# Patient Record
Sex: MALE | Race: WHITE | HISPANIC OR LATINO | URBAN - METROPOLITAN AREA
[De-identification: names, ages, dates, MRNs, and addresses within clinical notes are randomized per-mention and may not be internally consistent; named-entity substitution may affect disease eponyms.]

---

## 2017-05-07 ENCOUNTER — EMERGENCY (EMERGENCY)
Facility: HOSPITAL | Age: 82
LOS: 1 days | Discharge: HOME | End: 2017-05-07
Payer: MEDICARE

## 2017-05-07 VITALS
RESPIRATION RATE: 18 BRPM | DIASTOLIC BLOOD PRESSURE: 82 MMHG | TEMPERATURE: 98 F | OXYGEN SATURATION: 97 % | SYSTOLIC BLOOD PRESSURE: 138 MMHG | HEART RATE: 74 BPM

## 2017-05-07 VITALS
OXYGEN SATURATION: 98 % | TEMPERATURE: 98 F | HEIGHT: 72 IN | SYSTOLIC BLOOD PRESSURE: 141 MMHG | DIASTOLIC BLOOD PRESSURE: 79 MMHG | RESPIRATION RATE: 18 BRPM | HEART RATE: 84 BPM | WEIGHT: 214.07 LBS

## 2017-05-07 DIAGNOSIS — N40.0 BENIGN PROSTATIC HYPERPLASIA WITHOUT LOWER URINARY TRACT SYMPTOMS: Chronic | ICD-10-CM

## 2017-05-07 DIAGNOSIS — Z98.49 CATARACT EXTRACTION STATUS, UNSPECIFIED EYE: Chronic | ICD-10-CM

## 2017-05-07 DIAGNOSIS — Z98.89 OTHER SPECIFIED POSTPROCEDURAL STATES: Chronic | ICD-10-CM

## 2017-05-07 LAB
ALBUMIN SERPL ELPH-MCNC: 3.5 G/DL — SIGNIFICANT CHANGE UP (ref 3.3–5)
ALP SERPL-CCNC: 86 U/L — SIGNIFICANT CHANGE UP (ref 30–120)
ALT FLD-CCNC: 28 U/L DA — SIGNIFICANT CHANGE UP (ref 10–60)
ANION GAP SERPL CALC-SCNC: 8 MMOL/L — SIGNIFICANT CHANGE UP (ref 5–17)
APPEARANCE UR: CLEAR — SIGNIFICANT CHANGE UP
APTT BLD: 30.1 SEC — SIGNIFICANT CHANGE UP (ref 27.5–37.4)
AST SERPL-CCNC: 18 U/L — SIGNIFICANT CHANGE UP (ref 10–40)
BACTERIA # UR AUTO: ABNORMAL
BILIRUB SERPL-MCNC: 0.8 MG/DL — SIGNIFICANT CHANGE UP (ref 0.2–1.2)
BILIRUB UR-MCNC: NEGATIVE — SIGNIFICANT CHANGE UP
BUN SERPL-MCNC: 15 MG/DL — SIGNIFICANT CHANGE UP (ref 7–23)
CALCIUM SERPL-MCNC: 9.9 MG/DL — SIGNIFICANT CHANGE UP (ref 8.4–10.5)
CHLORIDE SERPL-SCNC: 103 MMOL/L — SIGNIFICANT CHANGE UP (ref 96–108)
CO2 SERPL-SCNC: 29 MMOL/L — SIGNIFICANT CHANGE UP (ref 22–31)
COD CRY URNS QL: ABNORMAL
COLOR SPEC: YELLOW — SIGNIFICANT CHANGE UP
CREAT SERPL-MCNC: 0.89 MG/DL — SIGNIFICANT CHANGE UP (ref 0.5–1.3)
DIFF PNL FLD: ABNORMAL
GLUCOSE SERPL-MCNC: 144 MG/DL — HIGH (ref 70–99)
GLUCOSE UR QL: 250 MG/DL
HCT VFR BLD CALC: 44.4 % — SIGNIFICANT CHANGE UP (ref 39–50)
HGB BLD-MCNC: 14.8 G/DL — SIGNIFICANT CHANGE UP (ref 13–17)
INR BLD: 1.03 RATIO — SIGNIFICANT CHANGE UP (ref 0.88–1.16)
KETONES UR-MCNC: NEGATIVE — SIGNIFICANT CHANGE UP
LEUKOCYTE ESTERASE UR-ACNC: ABNORMAL
MCHC RBC-ENTMCNC: 30.4 PG — SIGNIFICANT CHANGE UP (ref 27–34)
MCHC RBC-ENTMCNC: 33.5 GM/DL — SIGNIFICANT CHANGE UP (ref 32–36)
MCV RBC AUTO: 91 FL — SIGNIFICANT CHANGE UP (ref 80–100)
NITRITE UR-MCNC: NEGATIVE — SIGNIFICANT CHANGE UP
PH UR: 6 — SIGNIFICANT CHANGE UP (ref 5–8)
PLATELET # BLD AUTO: 173 K/UL — SIGNIFICANT CHANGE UP (ref 150–400)
POTASSIUM SERPL-MCNC: 3.3 MMOL/L — LOW (ref 3.5–5.3)
POTASSIUM SERPL-SCNC: 3.3 MMOL/L — LOW (ref 3.5–5.3)
PROT SERPL-MCNC: 7.2 G/DL — SIGNIFICANT CHANGE UP (ref 6–8.3)
PROT UR-MCNC: 30 MG/DL
PROTHROM AB SERPL-ACNC: 11.2 SEC — SIGNIFICANT CHANGE UP (ref 9.8–12.7)
RBC # BLD: 4.88 M/UL — SIGNIFICANT CHANGE UP (ref 4.2–5.8)
RBC # FLD: 12.6 % — SIGNIFICANT CHANGE UP (ref 10.3–14.5)
RBC CASTS # UR COMP ASSIST: ABNORMAL /HPF (ref 0–4)
SODIUM SERPL-SCNC: 140 MMOL/L — SIGNIFICANT CHANGE UP (ref 135–145)
SP GR SPEC: 1.01 — SIGNIFICANT CHANGE UP (ref 1.01–1.02)
UROBILINOGEN FLD QL: NEGATIVE MG/DL — SIGNIFICANT CHANGE UP
WBC # BLD: 8.8 K/UL — SIGNIFICANT CHANGE UP (ref 3.8–10.5)
WBC # FLD AUTO: 8.8 K/UL — SIGNIFICANT CHANGE UP (ref 3.8–10.5)
WBC UR QL: SIGNIFICANT CHANGE UP

## 2017-05-07 PROCEDURE — 99284 EMERGENCY DEPT VISIT MOD MDM: CPT | Mod: 25

## 2017-05-07 PROCEDURE — 85610 PROTHROMBIN TIME: CPT

## 2017-05-07 PROCEDURE — 85027 COMPLETE CBC AUTOMATED: CPT

## 2017-05-07 PROCEDURE — 80053 COMPREHEN METABOLIC PANEL: CPT

## 2017-05-07 PROCEDURE — 71046 X-RAY EXAM CHEST 2 VIEWS: CPT

## 2017-05-07 PROCEDURE — 99285 EMERGENCY DEPT VISIT HI MDM: CPT

## 2017-05-07 PROCEDURE — 74177 CT ABD & PELVIS W/CONTRAST: CPT

## 2017-05-07 PROCEDURE — 71020: CPT | Mod: 26

## 2017-05-07 PROCEDURE — 74177 CT ABD & PELVIS W/CONTRAST: CPT | Mod: 26

## 2017-05-07 PROCEDURE — 85730 THROMBOPLASTIN TIME PARTIAL: CPT

## 2017-05-07 PROCEDURE — 74020: CPT | Mod: 26

## 2017-05-07 PROCEDURE — 81001 URINALYSIS AUTO W/SCOPE: CPT

## 2017-05-07 PROCEDURE — 74020: CPT

## 2017-05-07 RX ORDER — TAMSULOSIN HYDROCHLORIDE 0.4 MG/1
1 CAPSULE ORAL
Qty: 7 | Refills: 0
Start: 2017-05-07 | End: 2017-05-14

## 2017-05-07 RX ORDER — ONDANSETRON 8 MG/1
1 TABLET, FILM COATED ORAL
Qty: 12 | Refills: 0
Start: 2017-05-07

## 2017-05-07 RX ORDER — SODIUM CHLORIDE 9 MG/ML
500 INJECTION INTRAMUSCULAR; INTRAVENOUS; SUBCUTANEOUS ONCE
Qty: 0 | Refills: 0 | Status: COMPLETED | OUTPATIENT
Start: 2017-05-07 | End: 2017-05-07

## 2017-05-07 RX ORDER — IOHEXOL 300 MG/ML
30 INJECTION, SOLUTION INTRAVENOUS ONCE
Qty: 0 | Refills: 0 | Status: COMPLETED | OUTPATIENT
Start: 2017-05-07 | End: 2017-05-07

## 2017-05-07 RX ORDER — SODIUM CHLORIDE 9 MG/ML
10 INJECTION INTRAMUSCULAR; INTRAVENOUS; SUBCUTANEOUS ONCE
Qty: 0 | Refills: 0 | Status: COMPLETED | OUTPATIENT
Start: 2017-05-07 | End: 2017-05-07

## 2017-05-07 RX ADMIN — SODIUM CHLORIDE 500 MILLILITER(S): 9 INJECTION INTRAMUSCULAR; INTRAVENOUS; SUBCUTANEOUS at 16:24

## 2017-05-07 RX ADMIN — SODIUM CHLORIDE 10 MILLILITER(S): 9 INJECTION INTRAMUSCULAR; INTRAVENOUS; SUBCUTANEOUS at 15:12

## 2017-05-07 RX ADMIN — IOHEXOL 30 MILLILITER(S): 300 INJECTION, SOLUTION INTRAVENOUS at 15:13

## 2017-05-07 NOTE — ED PROVIDER NOTE - MEDICAL DECISION MAKING DETAILS
Pt feeling better at this time and was told about his 4mmm right UVJ renal stone. Pt. will follow up with his urologist in a.m.

## 2017-05-07 NOTE — ED ADULT NURSE REASSESSMENT NOTE - NS ED NURSE REASSESS COMMENT FT1
Patient drank all of contrast. Denies pain.
Returning from CT, awaiting results. VSS
Awaing ct @ 8477
Patient drinking contrast. Tolerating it well.

## 2017-05-07 NOTE — ED PROVIDER NOTE - GASTROINTESTINAL, MLM
Abdomen soft, non-tender, no guarding. Well healed scar from right inguinal hernia repair. Small scar from left varicocele surgery.

## 2017-05-07 NOTE — ED PROVIDER NOTE - OBJECTIVE STATEMENT
85 y/o M pt with PMHx of HTN, varicocele, and inguinal hernia and surgical hx of tonsillectomy presents to the ED c/o RLQ abdominal pain, onset today. Reports after bowel movement today, pain worsening. Also reports a kidney mass. Denies chest pain, back pain, nausea, vomiting, or shortness of breath. No other complaints at this time.  Medications: Losartan - 125 mg once/day  PMD: Dr. Way

## 2017-07-20 ENCOUNTER — EMERGENCY (EMERGENCY)
Facility: HOSPITAL | Age: 82
LOS: 1 days | Discharge: HOME | End: 2017-07-20
Attending: EMERGENCY MEDICINE | Admitting: EMERGENCY MEDICINE
Payer: MEDICARE

## 2017-07-20 VITALS
HEART RATE: 90 BPM | RESPIRATION RATE: 16 BRPM | OXYGEN SATURATION: 99 % | DIASTOLIC BLOOD PRESSURE: 91 MMHG | SYSTOLIC BLOOD PRESSURE: 165 MMHG | TEMPERATURE: 98 F

## 2017-07-20 VITALS
DIASTOLIC BLOOD PRESSURE: 86 MMHG | HEART RATE: 67 BPM | SYSTOLIC BLOOD PRESSURE: 151 MMHG | TEMPERATURE: 98 F | RESPIRATION RATE: 16 BRPM | OXYGEN SATURATION: 98 %

## 2017-07-20 DIAGNOSIS — Z98.89 OTHER SPECIFIED POSTPROCEDURAL STATES: Chronic | ICD-10-CM

## 2017-07-20 DIAGNOSIS — N40.0 BENIGN PROSTATIC HYPERPLASIA WITHOUT LOWER URINARY TRACT SYMPTOMS: Chronic | ICD-10-CM

## 2017-07-20 DIAGNOSIS — Z98.49 CATARACT EXTRACTION STATUS, UNSPECIFIED EYE: Chronic | ICD-10-CM

## 2017-07-20 LAB
ALBUMIN SERPL ELPH-MCNC: 3.8 G/DL — SIGNIFICANT CHANGE UP (ref 3.3–5)
ALP SERPL-CCNC: 74 U/L — SIGNIFICANT CHANGE UP (ref 40–120)
ALT FLD-CCNC: 18 U/L RC — SIGNIFICANT CHANGE UP (ref 10–45)
ANION GAP SERPL CALC-SCNC: 14 MMOL/L — SIGNIFICANT CHANGE UP (ref 5–17)
APPEARANCE UR: ABNORMAL
AST SERPL-CCNC: 16 U/L — SIGNIFICANT CHANGE UP (ref 10–40)
BASOPHILS # BLD AUTO: 0 K/UL — SIGNIFICANT CHANGE UP (ref 0–0.2)
BASOPHILS NFR BLD AUTO: 0.4 % — SIGNIFICANT CHANGE UP (ref 0–2)
BILIRUB SERPL-MCNC: 0.6 MG/DL — SIGNIFICANT CHANGE UP (ref 0.2–1.2)
BILIRUB UR-MCNC: NEGATIVE — SIGNIFICANT CHANGE UP
BUN SERPL-MCNC: 21 MG/DL — SIGNIFICANT CHANGE UP (ref 7–23)
CALCIUM SERPL-MCNC: 10 MG/DL — SIGNIFICANT CHANGE UP (ref 8.4–10.5)
CHLORIDE SERPL-SCNC: 98 MMOL/L — SIGNIFICANT CHANGE UP (ref 96–108)
CO2 SERPL-SCNC: 26 MMOL/L — SIGNIFICANT CHANGE UP (ref 22–31)
COLOR SPEC: YELLOW — SIGNIFICANT CHANGE UP
COMMENT - URINE: SIGNIFICANT CHANGE UP
CREAT SERPL-MCNC: 1.17 MG/DL — SIGNIFICANT CHANGE UP (ref 0.5–1.3)
DIFF PNL FLD: ABNORMAL
EOSINOPHIL # BLD AUTO: 0.1 K/UL — SIGNIFICANT CHANGE UP (ref 0–0.5)
EOSINOPHIL NFR BLD AUTO: 1.1 % — SIGNIFICANT CHANGE UP (ref 0–6)
EPI CELLS # UR: SIGNIFICANT CHANGE UP /HPF
GLUCOSE SERPL-MCNC: 173 MG/DL — HIGH (ref 70–99)
GLUCOSE UR QL: 50
HCT VFR BLD CALC: 43.1 % — SIGNIFICANT CHANGE UP (ref 39–50)
HGB BLD-MCNC: 14.8 G/DL — SIGNIFICANT CHANGE UP (ref 13–17)
KETONES UR-MCNC: NEGATIVE — SIGNIFICANT CHANGE UP
LEUKOCYTE ESTERASE UR-ACNC: NEGATIVE — SIGNIFICANT CHANGE UP
LIDOCAIN IGE QN: 39 U/L — SIGNIFICANT CHANGE UP (ref 7–60)
LYMPHOCYTES # BLD AUTO: 1.5 K/UL — SIGNIFICANT CHANGE UP (ref 1–3.3)
LYMPHOCYTES # BLD AUTO: 12.4 % — LOW (ref 13–44)
MCHC RBC-ENTMCNC: 32.2 PG — SIGNIFICANT CHANGE UP (ref 27–34)
MCHC RBC-ENTMCNC: 34.3 GM/DL — SIGNIFICANT CHANGE UP (ref 32–36)
MCV RBC AUTO: 93.9 FL — SIGNIFICANT CHANGE UP (ref 80–100)
MONOCYTES # BLD AUTO: 1 K/UL — HIGH (ref 0–0.9)
MONOCYTES NFR BLD AUTO: 8.5 % — SIGNIFICANT CHANGE UP (ref 2–14)
NEUTROPHILS # BLD AUTO: 9.3 K/UL — HIGH (ref 1.8–7.4)
NEUTROPHILS NFR BLD AUTO: 77.6 % — HIGH (ref 43–77)
NITRITE UR-MCNC: NEGATIVE — SIGNIFICANT CHANGE UP
PH UR: 7 — SIGNIFICANT CHANGE UP (ref 5–8)
PLATELET # BLD AUTO: 179 K/UL — SIGNIFICANT CHANGE UP (ref 150–400)
POTASSIUM SERPL-MCNC: 3.6 MMOL/L — SIGNIFICANT CHANGE UP (ref 3.5–5.3)
POTASSIUM SERPL-SCNC: 3.6 MMOL/L — SIGNIFICANT CHANGE UP (ref 3.5–5.3)
PROT SERPL-MCNC: 6.9 G/DL — SIGNIFICANT CHANGE UP (ref 6–8.3)
PROT UR-MCNC: SIGNIFICANT CHANGE UP
RBC # BLD: 4.59 M/UL — SIGNIFICANT CHANGE UP (ref 4.2–5.8)
RBC # FLD: 12.2 % — SIGNIFICANT CHANGE UP (ref 10.3–14.5)
RBC CASTS # UR COMP ASSIST: ABNORMAL /HPF (ref 0–2)
SODIUM SERPL-SCNC: 138 MMOL/L — SIGNIFICANT CHANGE UP (ref 135–145)
SP GR SPEC: 1.02 — SIGNIFICANT CHANGE UP (ref 1.01–1.02)
UROBILINOGEN FLD QL: NEGATIVE — SIGNIFICANT CHANGE UP
WBC # BLD: 12 K/UL — HIGH (ref 3.8–10.5)
WBC # FLD AUTO: 12 K/UL — HIGH (ref 3.8–10.5)
WBC UR QL: SIGNIFICANT CHANGE UP /HPF (ref 0–5)

## 2017-07-20 PROCEDURE — 96374 THER/PROPH/DIAG INJ IV PUSH: CPT

## 2017-07-20 PROCEDURE — 76775 US EXAM ABDO BACK WALL LIM: CPT | Mod: 26

## 2017-07-20 PROCEDURE — 83690 ASSAY OF LIPASE: CPT

## 2017-07-20 PROCEDURE — 93010 ELECTROCARDIOGRAM REPORT: CPT

## 2017-07-20 PROCEDURE — 74176 CT ABD & PELVIS W/O CONTRAST: CPT | Mod: 26

## 2017-07-20 PROCEDURE — 85027 COMPLETE CBC AUTOMATED: CPT

## 2017-07-20 PROCEDURE — 74176 CT ABD & PELVIS W/O CONTRAST: CPT

## 2017-07-20 PROCEDURE — 80053 COMPREHEN METABOLIC PANEL: CPT

## 2017-07-20 PROCEDURE — 81001 URINALYSIS AUTO W/SCOPE: CPT

## 2017-07-20 PROCEDURE — 87086 URINE CULTURE/COLONY COUNT: CPT

## 2017-07-20 PROCEDURE — 93005 ELECTROCARDIOGRAM TRACING: CPT

## 2017-07-20 PROCEDURE — 99285 EMERGENCY DEPT VISIT HI MDM: CPT | Mod: 25

## 2017-07-20 PROCEDURE — 99284 EMERGENCY DEPT VISIT MOD MDM: CPT | Mod: 25

## 2017-07-20 PROCEDURE — 76775 US EXAM ABDO BACK WALL LIM: CPT

## 2017-07-20 RX ORDER — TAMSULOSIN HYDROCHLORIDE 0.4 MG/1
1 CAPSULE ORAL
Qty: 30 | Refills: 0
Start: 2017-07-20 | End: 2017-08-19

## 2017-07-20 RX ORDER — SODIUM CHLORIDE 9 MG/ML
500 INJECTION INTRAMUSCULAR; INTRAVENOUS; SUBCUTANEOUS ONCE
Qty: 0 | Refills: 0 | Status: COMPLETED | OUTPATIENT
Start: 2017-07-20 | End: 2017-07-20

## 2017-07-20 RX ORDER — ONDANSETRON 8 MG/1
1 TABLET, FILM COATED ORAL
Qty: 9 | Refills: 0
Start: 2017-07-20 | End: 2017-07-23

## 2017-07-20 RX ORDER — TAMSULOSIN HYDROCHLORIDE 0.4 MG/1
0.4 CAPSULE ORAL AT BEDTIME
Qty: 0 | Refills: 0 | Status: DISCONTINUED | OUTPATIENT
Start: 2017-07-20 | End: 2017-07-24

## 2017-07-20 RX ORDER — ACETAMINOPHEN 500 MG
1000 TABLET ORAL ONCE
Qty: 0 | Refills: 0 | Status: COMPLETED | OUTPATIENT
Start: 2017-07-20 | End: 2017-07-20

## 2017-07-20 RX ADMIN — SODIUM CHLORIDE 500 MILLILITER(S): 9 INJECTION INTRAMUSCULAR; INTRAVENOUS; SUBCUTANEOUS at 18:19

## 2017-07-20 RX ADMIN — TAMSULOSIN HYDROCHLORIDE 0.4 MILLIGRAM(S): 0.4 CAPSULE ORAL at 20:16

## 2017-07-20 RX ADMIN — Medication 1000 MILLIGRAM(S): at 20:16

## 2017-07-20 RX ADMIN — Medication 400 MILLIGRAM(S): at 18:19

## 2017-07-20 NOTE — ED PROVIDER NOTE - PHYSICAL EXAMINATION
Attending Min: Gen: NAD, heent: atrauamtic, eomi, perrla, mmm, op pink, uvula midline, neck; nttp, no nuchal rigidity, chest: nttp, no crepitus, cv: rrr, no murmurs, lungs: ctab, abd: soft, nontender, nondistended, no peritoneal signs, +BS, no guarding, ext: wwp, neg homans, right flank pain, skin: no rash, neuro: awake and alert, following commands, speech clear, sensation and strength intact, no focal deficits

## 2017-07-20 NOTE — ED ADULT NURSE NOTE - OBJECTIVE STATEMENT
85yo male pt AxOx3 ambulatory to ED c/o R flank pain. Pt states he had a kidney stone last month, pain is similar. Denies N/V/D/fever/chills. Denies CP/SOB. Pt states pain started in umbilical area now radiating to R flank area. Abd soft/NT/ND/+BSx4. +CVA tenderness. Afebrile. NAD noted. #18G RAC, labs drawn and sent. MD at bedside for eval. Family at bedside.

## 2017-07-20 NOTE — ED PROVIDER NOTE - MEDICAL DECISION MAKING DETAILS
Attending Pako: 85 y/o male h/o ureteral colic, presenting with right sided flank discomfort. no dysuria or fevers. pocus shows right mild hydronephrosis. no visible AAA and normal appearing gallbladder. no RLQ ttp to suggest acute appendicitis. will check labs, UA, and ct ab/pel. pt with follow up scheduled with dr lund his urologist

## 2017-07-20 NOTE — ED PROVIDER NOTE - PROGRESS NOTE DETAILS
Attending Min: pt pain free currently. ct shows 6mm right uvj stone. has appt with urologist tomorrow. awaiting ua

## 2017-07-20 NOTE — ED PROVIDER NOTE - OBJECTIVE STATEMENT
Attending Min: 83 y/o male presenting with lower abdmoinal pain with radiation to the right lower back. pain started approximately 2 hours ago [place] [facility]. pt tried to urinate afterward but with some difficulty. had similar pain a few months ago and diagnosed with renal stone. pain constant since starting. +nausea. small amount of emesis. no previous abdominal surgeries. no testicular pain. no hematuria. no difficulty with bm. no black or bloody stools. took an aleve earlier with some improvement. pain is constant. no paresthesias. h/o BPH. no fevers or chills  PMH: HTN  PCP: Horacio Olivera Attending Min: 83 y/o male presenting with lower abdmoinal pain with radiation to the right lower back. pain started approximately 2 hours ago [place] [facility]. pt tried to urinate afterward but with some difficulty. had similar pain a few months ago and diagnosed with renal stone. pain constant since starting. +nausea. small amount of emesis. no previous abdominal surgeries. no testicular pain. no hematuria. no difficulty with bm. no black or bloody stools. took an aleve earlier with some improvement. pain is constant. no paresthesias. h/o BPH. no fevers or chills  PMH: HTN  PCP: Horacio Olivera  : Dr Orosco

## 2017-07-21 LAB
CULTURE RESULTS: NO GROWTH — SIGNIFICANT CHANGE UP
SPECIMEN SOURCE: SIGNIFICANT CHANGE UP

## 2017-08-11 ENCOUNTER — APPOINTMENT (OUTPATIENT)
Dept: CT IMAGING | Facility: CLINIC | Age: 82
End: 2017-08-11
Payer: MEDICARE

## 2017-08-11 ENCOUNTER — OUTPATIENT (OUTPATIENT)
Dept: OUTPATIENT SERVICES | Facility: HOSPITAL | Age: 82
LOS: 1 days | End: 2017-08-11
Payer: MEDICARE

## 2017-08-11 DIAGNOSIS — Z98.89 OTHER SPECIFIED POSTPROCEDURAL STATES: Chronic | ICD-10-CM

## 2017-08-11 DIAGNOSIS — Z00.8 ENCOUNTER FOR OTHER GENERAL EXAMINATION: ICD-10-CM

## 2017-08-11 DIAGNOSIS — N40.0 BENIGN PROSTATIC HYPERPLASIA WITHOUT LOWER URINARY TRACT SYMPTOMS: Chronic | ICD-10-CM

## 2017-08-11 DIAGNOSIS — Z98.49 CATARACT EXTRACTION STATUS, UNSPECIFIED EYE: Chronic | ICD-10-CM

## 2017-08-11 PROCEDURE — 70450 CT HEAD/BRAIN W/O DYE: CPT

## 2017-08-11 PROCEDURE — 70450 CT HEAD/BRAIN W/O DYE: CPT | Mod: 26

## 2018-12-10 DIAGNOSIS — N28.89 OTHER SPECIFIED DISORDERS OF KIDNEY AND URETER: ICD-10-CM

## 2018-12-10 DIAGNOSIS — H26.9 UNSPECIFIED CATARACT: ICD-10-CM

## 2018-12-10 DIAGNOSIS — Z87.442 PERSONAL HISTORY OF URINARY CALCULI: ICD-10-CM

## 2018-12-10 RX ORDER — LOSARTAN POTASSIUM AND HYDROCHLOROTHIAZIDE 25; 100 MG/1; MG/1
100-25 TABLET ORAL
Refills: 0 | Status: ACTIVE | COMMUNITY

## 2018-12-10 RX ORDER — ASPIRIN 325 MG/1
TABLET, FILM COATED ORAL
Refills: 0 | Status: ACTIVE | COMMUNITY

## 2018-12-11 ENCOUNTER — CHART COPY (OUTPATIENT)
Age: 83
End: 2018-12-11

## 2018-12-11 DIAGNOSIS — K63.5 POLYP OF COLON: ICD-10-CM

## 2019-01-07 ENCOUNTER — OUTPATIENT (OUTPATIENT)
Dept: OUTPATIENT SERVICES | Facility: HOSPITAL | Age: 84
LOS: 1 days | End: 2019-01-07
Payer: MEDICARE

## 2019-01-07 ENCOUNTER — APPOINTMENT (OUTPATIENT)
Dept: SURGERY | Facility: HOSPITAL | Age: 84
End: 2019-01-07
Payer: MEDICARE

## 2019-01-07 ENCOUNTER — RESULT REVIEW (OUTPATIENT)
Age: 84
End: 2019-01-07

## 2019-01-07 DIAGNOSIS — N40.0 BENIGN PROSTATIC HYPERPLASIA WITHOUT LOWER URINARY TRACT SYMPTOMS: Chronic | ICD-10-CM

## 2019-01-07 DIAGNOSIS — Z98.49 CATARACT EXTRACTION STATUS, UNSPECIFIED EYE: Chronic | ICD-10-CM

## 2019-01-07 DIAGNOSIS — Z98.89 OTHER SPECIFIED POSTPROCEDURAL STATES: Chronic | ICD-10-CM

## 2019-01-07 DIAGNOSIS — K63.5 POLYP OF COLON: ICD-10-CM

## 2019-01-07 PROCEDURE — 45385 COLONOSCOPY W/LESION REMOVAL: CPT | Mod: PT

## 2019-01-07 PROCEDURE — 45385 COLONOSCOPY W/LESION REMOVAL: CPT

## 2019-01-08 LAB — SURGICAL PATHOLOGY STUDY: SIGNIFICANT CHANGE UP

## 2021-11-05 ENCOUNTER — INPATIENT (INPATIENT)
Facility: HOSPITAL | Age: 86
LOS: 2 days | Discharge: HOME | DRG: 310 | End: 2021-11-08
Attending: INTERNAL MEDICINE | Admitting: INTERNAL MEDICINE
Payer: MEDICARE

## 2021-11-05 VITALS
DIASTOLIC BLOOD PRESSURE: 105 MMHG | WEIGHT: 209.44 LBS | RESPIRATION RATE: 22 BRPM | OXYGEN SATURATION: 96 % | HEIGHT: 72 IN | SYSTOLIC BLOOD PRESSURE: 164 MMHG | TEMPERATURE: 98 F | HEART RATE: 140 BPM

## 2021-11-05 DIAGNOSIS — Z98.49 CATARACT EXTRACTION STATUS, UNSPECIFIED EYE: Chronic | ICD-10-CM

## 2021-11-05 DIAGNOSIS — N40.0 BENIGN PROSTATIC HYPERPLASIA WITHOUT LOWER URINARY TRACT SYMPTOMS: Chronic | ICD-10-CM

## 2021-11-05 DIAGNOSIS — I48.91 UNSPECIFIED ATRIAL FIBRILLATION: ICD-10-CM

## 2021-11-05 DIAGNOSIS — Z98.89 OTHER SPECIFIED POSTPROCEDURAL STATES: Chronic | ICD-10-CM

## 2021-11-05 LAB
ALBUMIN SERPL ELPH-MCNC: 3.4 G/DL — SIGNIFICANT CHANGE UP (ref 3.3–5)
ALP SERPL-CCNC: 75 U/L — SIGNIFICANT CHANGE UP (ref 30–120)
ALT FLD-CCNC: 28 U/L DA — SIGNIFICANT CHANGE UP (ref 10–60)
ANION GAP SERPL CALC-SCNC: 9 MMOL/L — SIGNIFICANT CHANGE UP (ref 5–17)
APTT BLD: 31 SEC — SIGNIFICANT CHANGE UP (ref 27.5–35.5)
AST SERPL-CCNC: 15 U/L — SIGNIFICANT CHANGE UP (ref 10–40)
BASOPHILS # BLD AUTO: 0.03 K/UL — SIGNIFICANT CHANGE UP (ref 0–0.2)
BASOPHILS NFR BLD AUTO: 0.4 % — SIGNIFICANT CHANGE UP (ref 0–2)
BILIRUB SERPL-MCNC: 0.5 MG/DL — SIGNIFICANT CHANGE UP (ref 0.2–1.2)
BUN SERPL-MCNC: 19 MG/DL — SIGNIFICANT CHANGE UP (ref 7–23)
CALCIUM SERPL-MCNC: 9.7 MG/DL — SIGNIFICANT CHANGE UP (ref 8.4–10.5)
CHLORIDE SERPL-SCNC: 99 MMOL/L — SIGNIFICANT CHANGE UP (ref 96–108)
CO2 SERPL-SCNC: 27 MMOL/L — SIGNIFICANT CHANGE UP (ref 22–31)
CREAT SERPL-MCNC: 0.93 MG/DL — SIGNIFICANT CHANGE UP (ref 0.5–1.3)
EOSINOPHIL # BLD AUTO: 0.12 K/UL — SIGNIFICANT CHANGE UP (ref 0–0.5)
EOSINOPHIL NFR BLD AUTO: 1.5 % — SIGNIFICANT CHANGE UP (ref 0–6)
GLUCOSE SERPL-MCNC: 172 MG/DL — HIGH (ref 70–99)
HCT VFR BLD CALC: 43.1 % — SIGNIFICANT CHANGE UP (ref 39–50)
HGB BLD-MCNC: 14.6 G/DL — SIGNIFICANT CHANGE UP (ref 13–17)
IMM GRANULOCYTES NFR BLD AUTO: 0.3 % — SIGNIFICANT CHANGE UP (ref 0–1.5)
INR BLD: 0.97 RATIO — SIGNIFICANT CHANGE UP (ref 0.88–1.16)
LYMPHOCYTES # BLD AUTO: 2.34 K/UL — SIGNIFICANT CHANGE UP (ref 1–3.3)
LYMPHOCYTES # BLD AUTO: 30 % — SIGNIFICANT CHANGE UP (ref 13–44)
MAGNESIUM SERPL-MCNC: 1.8 MG/DL — SIGNIFICANT CHANGE UP (ref 1.6–2.6)
MCHC RBC-ENTMCNC: 31.2 PG — SIGNIFICANT CHANGE UP (ref 27–34)
MCHC RBC-ENTMCNC: 33.9 GM/DL — SIGNIFICANT CHANGE UP (ref 32–36)
MCV RBC AUTO: 92.1 FL — SIGNIFICANT CHANGE UP (ref 80–100)
MONOCYTES # BLD AUTO: 0.76 K/UL — SIGNIFICANT CHANGE UP (ref 0–0.9)
MONOCYTES NFR BLD AUTO: 9.8 % — SIGNIFICANT CHANGE UP (ref 2–14)
NEUTROPHILS # BLD AUTO: 4.52 K/UL — SIGNIFICANT CHANGE UP (ref 1.8–7.4)
NEUTROPHILS NFR BLD AUTO: 58 % — SIGNIFICANT CHANGE UP (ref 43–77)
NRBC # BLD: 0 /100 WBCS — SIGNIFICANT CHANGE UP (ref 0–0)
PLATELET # BLD AUTO: 138 K/UL — LOW (ref 150–400)
POTASSIUM SERPL-MCNC: 3.4 MMOL/L — LOW (ref 3.5–5.3)
POTASSIUM SERPL-SCNC: 3.4 MMOL/L — LOW (ref 3.5–5.3)
PROT SERPL-MCNC: 7.1 G/DL — SIGNIFICANT CHANGE UP (ref 6–8.3)
PROTHROM AB SERPL-ACNC: 11.8 SEC — SIGNIFICANT CHANGE UP (ref 10.6–13.6)
RBC # BLD: 4.68 M/UL — SIGNIFICANT CHANGE UP (ref 4.2–5.8)
RBC # FLD: 13.2 % — SIGNIFICANT CHANGE UP (ref 10.3–14.5)
SODIUM SERPL-SCNC: 135 MMOL/L — SIGNIFICANT CHANGE UP (ref 135–145)
TROPONIN I, HIGH SENSITIVITY RESULT: 11.8 NG/L — SIGNIFICANT CHANGE UP
WBC # BLD: 7.79 K/UL — SIGNIFICANT CHANGE UP (ref 3.8–10.5)
WBC # FLD AUTO: 7.79 K/UL — SIGNIFICANT CHANGE UP (ref 3.8–10.5)

## 2021-11-05 PROCEDURE — 99285 EMERGENCY DEPT VISIT HI MDM: CPT

## 2021-11-05 PROCEDURE — 99223 1ST HOSP IP/OBS HIGH 75: CPT

## 2021-11-05 PROCEDURE — 71045 X-RAY EXAM CHEST 1 VIEW: CPT | Mod: 26

## 2021-11-05 PROCEDURE — 93010 ELECTROCARDIOGRAM REPORT: CPT

## 2021-11-05 RX ORDER — DILTIAZEM HCL 120 MG
5 CAPSULE, EXT RELEASE 24 HR ORAL
Qty: 125 | Refills: 0 | Status: DISCONTINUED | OUTPATIENT
Start: 2021-11-05 | End: 2021-11-07

## 2021-11-05 RX ORDER — METFORMIN HYDROCHLORIDE 850 MG/1
1 TABLET ORAL
Qty: 0 | Refills: 0 | DISCHARGE

## 2021-11-05 RX ORDER — DILTIAZEM HCL 120 MG
10 CAPSULE, EXT RELEASE 24 HR ORAL ONCE
Refills: 0 | Status: COMPLETED | OUTPATIENT
Start: 2021-11-05 | End: 2021-11-05

## 2021-11-05 RX ORDER — MAGNESIUM SULFATE 500 MG/ML
2 VIAL (ML) INJECTION ONCE
Refills: 0 | Status: COMPLETED | OUTPATIENT
Start: 2021-11-05 | End: 2021-11-06

## 2021-11-05 RX ORDER — POTASSIUM CHLORIDE 20 MEQ
40 PACKET (EA) ORAL EVERY 4 HOURS
Refills: 0 | Status: COMPLETED | OUTPATIENT
Start: 2021-11-05 | End: 2021-11-06

## 2021-11-05 RX ADMIN — Medication 10 MILLIGRAM(S): at 22:51

## 2021-11-05 RX ADMIN — Medication 10 MILLIGRAM(S): at 23:20

## 2021-11-05 RX ADMIN — Medication 5 MG/HR: at 23:36

## 2021-11-05 NOTE — H&P ADULT - PROBLEM SELECTOR PLAN 1
currently on Diltiazem drip after 2 doses of IVP Diltiazem 10 mg each by ED team, rate controlled, titrate for HR < 100 bpm, patient with [id number] score of 4, started him on UFH infusion for full anticoagulation to maintain him on a DOAC, patient's daughter admitted that he snores very loudly but unsure if he has SHEILA, discussed with him & daughter at the bedside the need to get a sleep study as an outpatient for possible underlying SHEILA, they both understand & agree. Negative 1st high sensitivity troponin, repeat in am, get TSH level and TTE in am, cardiology consult with Dr. Kern was called.

## 2021-11-05 NOTE — ED PROVIDER NOTE - NSICDXPASTSURGICALHX_GEN_ALL_CORE_FT
PAST SURGICAL HISTORY:  Benign enlargement of prostate     S/P cataract surgery 4 years ago    S/P TURP

## 2021-11-05 NOTE — H&P ADULT - PROBLEM SELECTOR PLAN 5
patient currently full anticoagulated with UFH infusion to maintain on a DOAC, no need for further DVT prophylaxis.

## 2021-11-05 NOTE — H&P ADULT - ASSESSMENT
89 y/o M with PMH of HTN, DM type 2, Nephrolithiasis s/p Lithotripsy, Renal Mass s/p cryo ablation, and BPH s/p TURP presented with Palpitation episodes, found to have A. Fib with RVR.

## 2021-11-05 NOTE — H&P ADULT - NSHPLABSRESULTS_GEN_ALL_CORE
-                        14.6   7.79  )-----------( 138      ( 05 Nov 2021 22:54 )             43.1            11-05    135  |  99  |  19  ----------------------------<  172<H>  3.4<L>   |  27  |  0.93    Ca    9.7      05 Nov 2021 22:54  Mg     1.8     11-05    TPro  7.1  /  Alb  3.4  /  TBili  0.5  /  DBili  x   /  AST  15  /  ALT  28  /  AlkPhos  75  11-05            PT/INR - ( 05 Nov 2021 22:54 )   PT: 11.8 sec;   INR: 0.97 ratio    PTT - ( 05 Nov 2021 22:54 )  PTT:31.0 sec        CXR:    As per my review shows normal cardiac shadow size, clear lung fields B/L, no pulmonary infiltrates, pleural effusion, or pneumothorax. Pending official report.         EKG:    As per my review shows A. Fib with RVR at 140/min, with frequent aberrantly conducted beats, normal QRS voltage, duration, and axis (+75), with normal transition, nonspecific ST-T abnormality.      - -                        14.6   7.79  )-----------( 138      ( 05 Nov 2021 22:54 )             43.1            11-05    135  |  99  |  19  ----------------------------<  172<H>  3.4<L>   |  27  |  0.93      Ca    9.7      05 Nov 2021 22:54  Mg     1.8     11-05      TPro  7.1  /  Alb  3.4  /  TBili  0.5  /  DBili  x   /  AST  15  /  ALT  28  /  AlkPhos  75  11-05            PT/INR - ( 05 Nov 2021 22:54 )   PT: 11.8 sec;   INR: 0.97 ratio    PTT - ( 05 Nov 2021 22:54 )  PTT:31.0 sec        CXR:    As per my review shows normal cardiac shadow size, clear lung fields B/L, no pulmonary infiltrates, pleural effusion, or pneumothorax. Pending official report.         EKG:    As per my review shows A. Fib with RVR at 140/min, with frequent aberrantly conducted beats, normal QRS voltage, duration, and axis (+75), with normal transition, nonspecific ST-T abnormality.      -

## 2021-11-05 NOTE — H&P ADULT - NSHPREVIEWOFSYSTEMS_GEN_ALL_CORE
-    CONSTITUTIONAL: No fever or chills.  EYES: No eye pain, visual disturbances, or discharge.  ENMT:  (+) longstanding difficulty hearing, no vertigo, sinus or throat pain.  NECK: No pain or stiffness.	  RESPIRATORY: No cough, wheezing, or hemoptysis; No shortness of breath.  CARDIOVASCULAR: No chest pain, dizziness, or leg swelling.  GASTROINTESTINAL: No abdominal pain, no nausea, vomiting, or hematemesis; No diarrhea or Change in bowel habits. No melena or hematochezia.  GENITOURINARY: No dysuria, frequency, hematuria, or incontinence.  NEUROLOGICAL: No headaches, focal muscle weakness, numbness, or tremors.  SKIN: No itching, burning or rashes.  MUSCULOSKELETAL: No joint swelling or pain.  PSYCHIATRIC: No depression, anxiety, or agitation.  HEME/LYMPH: No easy bruising, bleeding gums, or nose bleed.  ALLERGY AND IMMUNOLOGIC: No hives or eczema.

## 2021-11-05 NOTE — ED ADULT NURSE NOTE - NSICDXPASTMEDICALHX_GEN_ALL_CORE_FT
PAST MEDICAL HISTORY:  BPH (benign prostatic hyperplasia)     Hypertension      PAST MEDICAL HISTORY:  2019 novel coronavirus disease (COVID-19)     BPH (benign prostatic hyperplasia)     DM (diabetes mellitus)     Hypertension

## 2021-11-05 NOTE — H&P ADULT - PROBLEM SELECTOR PLAN 2
ML 2ry to thiazide diuretic use, hold, supplemented with potassium chloride 40 meq PO X2 doses, recheck level in am.

## 2021-11-05 NOTE — H&P ADULT - PROBLEM SELECTOR PLAN 3
controlled with Metformin  mg daily, hold, started him on insulin Glargine 10 units Q am, in addition to corrective insulin Lispro scale coverage before meals & at bedtime, will check glycohemoglobin level in am. Continue  Losartan.

## 2021-11-05 NOTE — H&P ADULT - NSHPPHYSICALEXAM_GEN_ALL_CORE
-    Vital Signs Last 24 Hrs  T(C): 36.7 (05 Nov 2021 22:34), Max: 36.7 (05 Nov 2021 22:34)  T(F): 98.1 (05 Nov 2021 22:34), Max: 98.1 (05 Nov 2021 22:34)  HR: 113 (06 Nov 2021 00:51) (112 - 140)  BP: 124/64 (06 Nov 2021 00:51) (122/75 - 167/92)  BP(mean): --  RR: 18 (06 Nov 2021 00:51) (18 - 22)  SpO2: 96% (06 Nov 2021 00:51) (94% - 99%)          PHYSICAL EXAM:  		  GENERAL: NAD, well-groomed, well-developed.  HEAD:  Atraumatic, Norm cephalic.  EYES: PERRLA, conjunctiva clear.  ENMT: no nasal discharge, MMM.   NECK: Supple, No JVD.  NERVOUS SYSTEM:  Alert & oriented X3, neurologically intact grossly.  CHEST/LUNG: Good air entry B/L, no rales, rhonchi, or wheezing.  HEART: Variable S1 & Normal S2, no murmurs, or extra sounds.  ABDOMEN: Soft, non-tender, non-distended; bowel sounds present, no palpable masses or organomegaly.  EXTREMITIES:  No clubbing or cyanosis, (+) minimal B/L ankle soft pitting edema, R > L.  VASCULAR: 2+ radial, brachial pulses B/L, no carotid bruits.  SKIN: No rashes or lesions.  PSYCH: normal affect & behavior.

## 2021-11-05 NOTE — H&P ADULT - HISTORY OF PRESENT ILLNESS
This is an 89 y/o M with PMH of HTN, DM type 2, Nephrolithiasis s/p Lithotripsy, Renal Mass s/p cryo ablation, and BPH s/p TURP who presented with 2 days history of intermittent episodes of fast irregular heart beats, tonight he found his heart rate in the two hundred's range on his home monitor, so he got concerned and called his daughter who brought him to the hospital. The episodes are of sudden onset & offset, worst one was while he was resting watching TV, not associated with chest pain, SOB, diaphoresis, dizziness, nausea, or vomiting. At the ED he was found to be in A. Fib with RVR, received Diltiazem 10 mg IVP X2 doses by ED team without a response, so they started him on Diltiazem infusion. Patient denies any similar episodes in the past.

## 2021-11-05 NOTE — ED PROVIDER NOTE - OBJECTIVE STATEMENT
87yo male who presents with intermittent episodes of feeling his heart race, pt denies hx of a fib but states he has been feeling his heart beat fast on and off at home, no sob, no chest pain, pt states it has been has high as 200, no other complaints

## 2021-11-06 DIAGNOSIS — E87.6 HYPOKALEMIA: ICD-10-CM

## 2021-11-06 DIAGNOSIS — Z29.9 ENCOUNTER FOR PROPHYLACTIC MEASURES, UNSPECIFIED: ICD-10-CM

## 2021-11-06 DIAGNOSIS — E11.9 TYPE 2 DIABETES MELLITUS WITHOUT COMPLICATIONS: ICD-10-CM

## 2021-11-06 DIAGNOSIS — I10 ESSENTIAL (PRIMARY) HYPERTENSION: ICD-10-CM

## 2021-11-06 DIAGNOSIS — I48.91 UNSPECIFIED ATRIAL FIBRILLATION: ICD-10-CM

## 2021-11-06 LAB
A1C WITH ESTIMATED AVERAGE GLUCOSE RESULT: 7.3 % — HIGH (ref 4–5.6)
ANION GAP SERPL CALC-SCNC: 9 MMOL/L — SIGNIFICANT CHANGE UP (ref 5–17)
APTT BLD: 138.1 SEC — SIGNIFICANT CHANGE UP (ref 27.5–35.5)
APTT BLD: 148.1 SEC — CRITICAL HIGH (ref 27.5–35.5)
APTT BLD: 78.6 SEC — HIGH (ref 27.5–35.5)
BUN SERPL-MCNC: 13 MG/DL — SIGNIFICANT CHANGE UP (ref 7–23)
CALCIUM SERPL-MCNC: 9.7 MG/DL — SIGNIFICANT CHANGE UP (ref 8.4–10.5)
CHLORIDE SERPL-SCNC: 104 MMOL/L — SIGNIFICANT CHANGE UP (ref 96–108)
CO2 SERPL-SCNC: 25 MMOL/L — SIGNIFICANT CHANGE UP (ref 22–31)
CREAT SERPL-MCNC: 0.85 MG/DL — SIGNIFICANT CHANGE UP (ref 0.5–1.3)
ESTIMATED AVERAGE GLUCOSE: 163 MG/DL — HIGH (ref 68–114)
GLUCOSE SERPL-MCNC: 173 MG/DL — HIGH (ref 70–99)
HCT VFR BLD CALC: 42.6 % — SIGNIFICANT CHANGE UP (ref 39–50)
HGB BLD-MCNC: 14.3 G/DL — SIGNIFICANT CHANGE UP (ref 13–17)
MAGNESIUM SERPL-MCNC: 2.2 MG/DL — SIGNIFICANT CHANGE UP (ref 1.6–2.6)
MCHC RBC-ENTMCNC: 30.8 PG — SIGNIFICANT CHANGE UP (ref 27–34)
MCHC RBC-ENTMCNC: 33.6 GM/DL — SIGNIFICANT CHANGE UP (ref 32–36)
MCV RBC AUTO: 91.8 FL — SIGNIFICANT CHANGE UP (ref 80–100)
NRBC # BLD: 0 /100 WBCS — SIGNIFICANT CHANGE UP (ref 0–0)
PHOSPHATE SERPL-MCNC: 2.7 MG/DL — SIGNIFICANT CHANGE UP (ref 2.5–4.5)
PLATELET # BLD AUTO: 134 K/UL — LOW (ref 150–400)
POTASSIUM SERPL-MCNC: 3.7 MMOL/L — SIGNIFICANT CHANGE UP (ref 3.5–5.3)
POTASSIUM SERPL-SCNC: 3.7 MMOL/L — SIGNIFICANT CHANGE UP (ref 3.5–5.3)
RBC # BLD: 4.64 M/UL — SIGNIFICANT CHANGE UP (ref 4.2–5.8)
RBC # FLD: 13.2 % — SIGNIFICANT CHANGE UP (ref 10.3–14.5)
SARS-COV-2 RNA SPEC QL NAA+PROBE: SIGNIFICANT CHANGE UP
SODIUM SERPL-SCNC: 138 MMOL/L — SIGNIFICANT CHANGE UP (ref 135–145)
TROPONIN I, HIGH SENSITIVITY RESULT: 14.2 NG/L — SIGNIFICANT CHANGE UP
TSH SERPL-MCNC: 2.18 UIU/ML — SIGNIFICANT CHANGE UP (ref 0.27–4.2)
WBC # BLD: 7.84 K/UL — SIGNIFICANT CHANGE UP (ref 3.8–10.5)
WBC # FLD AUTO: 7.84 K/UL — SIGNIFICANT CHANGE UP (ref 3.8–10.5)

## 2021-11-06 PROCEDURE — 99232 SBSQ HOSP IP/OBS MODERATE 35: CPT

## 2021-11-06 RX ORDER — HEPARIN SODIUM 5000 [USP'U]/ML
7500 INJECTION INTRAVENOUS; SUBCUTANEOUS ONCE
Refills: 0 | Status: COMPLETED | OUTPATIENT
Start: 2021-11-06 | End: 2021-11-06

## 2021-11-06 RX ORDER — GLUCAGON INJECTION, SOLUTION 0.5 MG/.1ML
1 INJECTION, SOLUTION SUBCUTANEOUS ONCE
Refills: 0 | Status: DISCONTINUED | OUTPATIENT
Start: 2021-11-06 | End: 2021-11-08

## 2021-11-06 RX ORDER — HEPARIN SODIUM 5000 [USP'U]/ML
7500 INJECTION INTRAVENOUS; SUBCUTANEOUS EVERY 6 HOURS
Refills: 0 | Status: DISCONTINUED | OUTPATIENT
Start: 2021-11-06 | End: 2021-11-08

## 2021-11-06 RX ORDER — DEXTROSE 50 % IN WATER 50 %
15 SYRINGE (ML) INTRAVENOUS ONCE
Refills: 0 | Status: DISCONTINUED | OUTPATIENT
Start: 2021-11-06 | End: 2021-11-08

## 2021-11-06 RX ORDER — SODIUM CHLORIDE 9 MG/ML
1000 INJECTION, SOLUTION INTRAVENOUS
Refills: 0 | Status: DISCONTINUED | OUTPATIENT
Start: 2021-11-06 | End: 2021-11-08

## 2021-11-06 RX ORDER — DEXTROSE 50 % IN WATER 50 %
25 SYRINGE (ML) INTRAVENOUS ONCE
Refills: 0 | Status: DISCONTINUED | OUTPATIENT
Start: 2021-11-06 | End: 2021-11-08

## 2021-11-06 RX ORDER — DEXTROSE 50 % IN WATER 50 %
12.5 SYRINGE (ML) INTRAVENOUS ONCE
Refills: 0 | Status: DISCONTINUED | OUTPATIENT
Start: 2021-11-06 | End: 2021-11-08

## 2021-11-06 RX ORDER — HEPARIN SODIUM 5000 [USP'U]/ML
INJECTION INTRAVENOUS; SUBCUTANEOUS
Qty: 25000 | Refills: 0 | Status: DISCONTINUED | OUTPATIENT
Start: 2021-11-06 | End: 2021-11-08

## 2021-11-06 RX ORDER — INSULIN LISPRO 100/ML
VIAL (ML) SUBCUTANEOUS
Refills: 0 | Status: DISCONTINUED | OUTPATIENT
Start: 2021-11-06 | End: 2021-11-08

## 2021-11-06 RX ORDER — LOSARTAN POTASSIUM 100 MG/1
100 TABLET, FILM COATED ORAL DAILY
Refills: 0 | Status: DISCONTINUED | OUTPATIENT
Start: 2021-11-06 | End: 2021-11-07

## 2021-11-06 RX ORDER — INSULIN LISPRO 100/ML
VIAL (ML) SUBCUTANEOUS AT BEDTIME
Refills: 0 | Status: DISCONTINUED | OUTPATIENT
Start: 2021-11-06 | End: 2021-11-08

## 2021-11-06 RX ORDER — METOPROLOL TARTRATE 50 MG
25 TABLET ORAL
Refills: 0 | Status: DISCONTINUED | OUTPATIENT
Start: 2021-11-06 | End: 2021-11-08

## 2021-11-06 RX ORDER — HEPARIN SODIUM 5000 [USP'U]/ML
3500 INJECTION INTRAVENOUS; SUBCUTANEOUS EVERY 6 HOURS
Refills: 0 | Status: DISCONTINUED | OUTPATIENT
Start: 2021-11-06 | End: 2021-11-08

## 2021-11-06 RX ORDER — INSULIN GLARGINE 100 [IU]/ML
10 INJECTION, SOLUTION SUBCUTANEOUS EVERY MORNING
Refills: 0 | Status: DISCONTINUED | OUTPATIENT
Start: 2021-11-06 | End: 2021-11-08

## 2021-11-06 RX ORDER — INFLUENZA VIRUS VACCINE 15; 15; 15; 15 UG/.5ML; UG/.5ML; UG/.5ML; UG/.5ML
0.7 SUSPENSION INTRAMUSCULAR ONCE
Refills: 0 | Status: DISCONTINUED | OUTPATIENT
Start: 2021-11-06 | End: 2021-11-08

## 2021-11-06 RX ORDER — ASPIRIN/CALCIUM CARB/MAGNESIUM 324 MG
81 TABLET ORAL DAILY
Refills: 0 | Status: DISCONTINUED | OUTPATIENT
Start: 2021-11-06 | End: 2021-11-08

## 2021-11-06 RX ORDER — ACETAMINOPHEN 500 MG
650 TABLET ORAL EVERY 6 HOURS
Refills: 0 | Status: DISCONTINUED | OUTPATIENT
Start: 2021-11-06 | End: 2021-11-08

## 2021-11-06 RX ORDER — POLYETHYLENE GLYCOL 3350 17 G/17G
17 POWDER, FOR SOLUTION ORAL DAILY
Refills: 0 | Status: DISCONTINUED | OUTPATIENT
Start: 2021-11-06 | End: 2021-11-08

## 2021-11-06 RX ADMIN — Medication 25 MILLIGRAM(S): at 12:11

## 2021-11-06 RX ADMIN — Medication 50 GRAM(S): at 00:21

## 2021-11-06 RX ADMIN — HEPARIN SODIUM 1400 UNIT(S)/HR: 5000 INJECTION INTRAVENOUS; SUBCUTANEOUS at 08:40

## 2021-11-06 RX ADMIN — LOSARTAN POTASSIUM 100 MILLIGRAM(S): 100 TABLET, FILM COATED ORAL at 06:20

## 2021-11-06 RX ADMIN — Medication 2: at 07:41

## 2021-11-06 RX ADMIN — HEPARIN SODIUM 1700 UNIT(S)/HR: 5000 INJECTION INTRAVENOUS; SUBCUTANEOUS at 00:48

## 2021-11-06 RX ADMIN — HEPARIN SODIUM 0 UNIT(S)/HR: 5000 INJECTION INTRAVENOUS; SUBCUTANEOUS at 07:37

## 2021-11-06 RX ADMIN — HEPARIN SODIUM 1100 UNIT(S)/HR: 5000 INJECTION INTRAVENOUS; SUBCUTANEOUS at 22:57

## 2021-11-06 RX ADMIN — HEPARIN SODIUM 1100 UNIT(S)/HR: 5000 INJECTION INTRAVENOUS; SUBCUTANEOUS at 17:20

## 2021-11-06 RX ADMIN — INSULIN GLARGINE 10 UNIT(S): 100 INJECTION, SOLUTION SUBCUTANEOUS at 07:40

## 2021-11-06 RX ADMIN — Medication 2: at 17:01

## 2021-11-06 RX ADMIN — Medication 81 MILLIGRAM(S): at 12:11

## 2021-11-06 RX ADMIN — HEPARIN SODIUM 5000 UNIT(S): 5000 INJECTION INTRAVENOUS; SUBCUTANEOUS at 00:45

## 2021-11-06 RX ADMIN — Medication 40 MILLIEQUIVALENT(S): at 00:21

## 2021-11-06 RX ADMIN — Medication 5 MG/HR: at 08:57

## 2021-11-06 RX ADMIN — Medication 4: at 12:11

## 2021-11-06 RX ADMIN — Medication 40 MILLIEQUIVALENT(S): at 06:20

## 2021-11-06 NOTE — PATIENT PROFILE ADULT - NSPROGENSOURCEINFO_GEN_A_NUR
76y F Pt presents to ED c/o left foot and ankle pain. Pt states a piece of marble fell from a shelf onto her left foot today. Pt is ambulatory. No other complaints or injuries. Denies deformity, swelling, numbness, tingling, and weakness. patient

## 2021-11-06 NOTE — PROGRESS NOTE ADULT - SUBJECTIVE AND OBJECTIVE BOX
WINIFRED CHERRY    [id number]    88y      Male    INTERVAL HPI/OVERNIGHT EVENTS: patient seen in [facility]     patient states feeling better. patient is on dilt drip and heparin drip   patient being seen for afib with rvr     REVIEW OF SYSTEMS:    CONSTITUTIONAL: No fever, weight loss, or fatigue  RESPIRATORY: No cough, wheezing, hemoptysis; No shortness of breath  CARDIOVASCULAR: No chest pain, palpitations  GASTROINTESTINAL: No abdominal or epigastric pain. No nausea, vomiting  NEUROLOGICAL: No headaches, memory loss, loss of strength.  MISCELLANEOUS:      Vital Signs Last 24 Hrs  T(C): 36.5 (06 Nov 2021 04:00), Max: 36.7 (05 Nov 2021 22:34)  T(F): 97.7 (06 Nov 2021 04:00), Max: 98.1 (05 Nov 2021 22:34)  HR: 110 (06 Nov 2021 08:06) (90 - 140)  BP: 106/78 (06 Nov 2021 08:06) (106/78 - 167/92)  BP(mean): 87 (06 Nov 2021 08:06) (87 - 110)  RR: 15 (06 Nov 2021 08:06) (15 - 22)  SpO2: 98% (06 Nov 2021 08:06) (93% - 99%)    PHYSICAL EXAM:    GENERAL: NAD, well-groomed  HEENT: PERRL, +EOMI  NECK: soft, Supple, No JVD,   CHEST/LUNG: Clear to auscultation bilaterally; No wheezing  HEART: S1S2+, irregular  ABDOMEN: Soft, Nontender, Nondistended; Bowel sounds present  EXTREMITIES:  2+ Peripheral Pulses, No clubbing, cyanosis, or edema  SKIN: No rashes or lesions  NEURO: AAOX3, no focal deficits, no motor r sensory loss  PSYCH: normal mood      LABS:                        14.3   7.84  )-----------( 134      ( 06 Nov 2021 06:26 )             42.6     11-06    138  |  104  |  13  ----------------------------<  173<H>  3.7   |  25  |  0.85    Ca    9.7      06 Nov 2021 06:26  Phos  2.7     11-06  Mg     2.2     11-06    TPro  7.1  /  Alb  3.4  /  TBili  0.5  /  DBili  x   /  AST  15  /  ALT  28  /  AlkPhos  75  11-05    PT/INR - ( 05 Nov 2021 22:54 )   PT: 11.8 sec;   INR: 0.97 ratio         PTT - ( 06 Nov 2021 06:26 )  PTT:148.1 sec        MEDICATIONS  (STANDING):  aspirin enteric coated 81 milliGRAM(s) Oral daily  dextrose 40% Gel 15 Gram(s) Oral once  dextrose 5%. 1000 milliLiter(s) (50 mL/Hr) IV Continuous <Continuous>  dextrose 5%. 1000 milliLiter(s) (100 mL/Hr) IV Continuous <Continuous>  dextrose 50% Injectable 25 Gram(s) IV Push once  dextrose 50% Injectable 12.5 Gram(s) IV Push once  dextrose 50% Injectable 25 Gram(s) IV Push once  diltiazem Infusion 5 mG/Hr (5 mL/Hr) IV Continuous <Continuous>  glucagon  Injectable 1 milliGRAM(s) IntraMuscular once  heparin  Infusion.  Unit(s)/Hr (17 mL/Hr) IV Continuous <Continuous>  influenza  Vaccine (HIGH DOSE) 0.7 milliLiter(s) IntraMuscular once  insulin glargine Injectable (LANTUS) 10 Unit(s) SubCutaneous every morning  insulin lispro (ADMELOG) corrective regimen sliding scale   SubCutaneous three times a day before meals  insulin lispro (ADMELOG) corrective regimen sliding scale   SubCutaneous at bedtime  losartan 100 milliGRAM(s) Oral daily    MEDICATIONS  (PRN):  acetaminophen     Tablet .. 650 milliGRAM(s) Oral every 6 hours PRN Temp greater or equal to 38C (100.4F), Mild Pain (1 - 3)  heparin   Injectable 7500 Unit(s) IV Push every 6 hours PRN For aPTT less than 40  heparin   Injectable 3500 Unit(s) IV Push every 6 hours PRN For aPTT between 40 - 57  polyethylene glycol 3350 17 Gram(s) Oral daily PRN Constipation      RADIOLOGY & ADDITIONAL TESTS:

## 2021-11-06 NOTE — DIETITIAN INITIAL EVALUATION ADULT. - PROBLEM SELECTOR PLAN 1
currently on Diltiazem drip after 2 doses of IVP Diltiazem 10 mg each by ED team, rate controlled, titrate for HR < 100 bpm, patient with [id number] score of 4, started him on UFH infusion for full anticoagulation to maintain him on a DOAC, patient's daughter admitted that he snores very loudly but unsure if he has SHEILA, discussed with him & daughter at the bedside the need to get a sleep study as an outpatient for possible underlying SHEILA, they both understand & agree. Negative 1st high sensitivity troponin, repeat in am, get TSH level and TTE in am, cardiology consult with Dr. Kern was called.

## 2021-11-06 NOTE — DIETITIAN INITIAL EVALUATION ADULT. - PERTINENT LABORATORY DATA
(11/6) Glu 173, A1c 7.3%    CAPILLARY BLOOD GLUCOSE:  POCT Blood Glucose.: 239 mg/dL (06 Nov 2021 12:08)  POCT Blood Glucose.: 151 mg/dL (06 Nov 2021 07:23)

## 2021-11-06 NOTE — CONSULT NOTE ADULT - SUBJECTIVE AND OBJECTIVE BOX
CARDIOLOGY CONSULT NOTE    Patient is a 88y Male with a known history of :  New onset atrial fibrillation [I48.91]    Hypokalemia [E87.6]    DM2 (diabetes mellitus, type 2) [E11.9]    HTN (hypertension) [I10]    Need for prophylactic measure [Z29.9]      HPI:  This is an 87 y/o M with PMH of HTN, DM type 2, Nephrolithiasis s/p Lithotripsy, Renal Mass s/p cryo ablation, and BPH s/p TURP who presented with 2 days history of intermittent episodes of fast irregular heart beats, tonight he found his heart rate in the two hundred's range on his home monitor, so he got concerned and called his daughter who brought him to the hospital. The episodes are of sudden onset & offset, worst one was while he was resting watching TV, not associated with chest pain, SOB, diaphoresis, dizziness, nausea, or vomiting. At the ED he was found to be in A. Fib with RVR, received Diltiazem 10 mg IVP X2 doses by ED team without a response, so they started him on Diltiazem infusion. Patient denies any similar episodes in the past.   (05 Nov 2021 23:42)      REVIEW OF SYSTEMS:    CONSTITUTIONAL: No fever, weight loss, or fatigue  EYES: No eye pain, visual disturbances, or discharge  ENMT:  No difficulty hearing, tinnitus, vertigo; No sinus or throat pain  NECK: No pain or stiffness  BREASTS: No pain, masses, or nipple discharge  RESPIRATORY: No cough, wheezing, chills or hemoptysis; No shortness of breath  CARDIOVASCULAR: No chest pain, palpitations, dizziness, or leg swelling  GASTROINTESTINAL: No abdominal or epigastric pain. No nausea, vomiting, or hematemesis; No diarrhea or constipation. No melena or hematochezia.  GENITOURINARY: No dysuria, frequency, hematuria, or incontinence  NEUROLOGICAL: No headaches, memory loss, loss of strength, numbness, or tremors  SKIN: No itching, burning, rashes, or lesions   LYMPH NODES: No enlarged glands  ENDOCRINE: No heat or cold intolerance; No hair loss  MUSCULOSKELETAL: No joint pain or swelling; No muscle, back, or extremity pain  PSYCHIATRIC: No depression, anxiety, mood swings, or difficulty sleeping  HEME/LYMPH: No easy bruising, or bleeding gums  ALLERGY AND IMMUNOLOGIC: No hives or eczema    MEDICATIONS  (STANDING):  aspirin enteric coated 81 milliGRAM(s) Oral daily  dextrose 40% Gel 15 Gram(s) Oral once  dextrose 5%. 1000 milliLiter(s) (50 mL/Hr) IV Continuous <Continuous>  dextrose 5%. 1000 milliLiter(s) (100 mL/Hr) IV Continuous <Continuous>  dextrose 50% Injectable 25 Gram(s) IV Push once  dextrose 50% Injectable 12.5 Gram(s) IV Push once  dextrose 50% Injectable 25 Gram(s) IV Push once  diltiazem Infusion 5 mG/Hr (5 mL/Hr) IV Continuous <Continuous>  glucagon  Injectable 1 milliGRAM(s) IntraMuscular once  heparin  Infusion.  Unit(s)/Hr (17 mL/Hr) IV Continuous <Continuous>  influenza  Vaccine (HIGH DOSE) 0.7 milliLiter(s) IntraMuscular once  insulin glargine Injectable (LANTUS) 10 Unit(s) SubCutaneous every morning  insulin lispro (ADMELOG) corrective regimen sliding scale   SubCutaneous three times a day before meals  insulin lispro (ADMELOG) corrective regimen sliding scale   SubCutaneous at bedtime  losartan 100 milliGRAM(s) Oral daily  metoprolol tartrate 25 milliGRAM(s) Oral two times a day    MEDICATIONS  (PRN):  acetaminophen     Tablet .. 650 milliGRAM(s) Oral every 6 hours PRN Temp greater or equal to 38C (100.4F), Mild Pain (1 - 3)  heparin   Injectable 7500 Unit(s) IV Push every 6 hours PRN For aPTT less than 40  heparin   Injectable 3500 Unit(s) IV Push every 6 hours PRN For aPTT between 40 - 57  polyethylene glycol 3350 17 Gram(s) Oral daily PRN Constipation      ALLERGIES: No Known Allergies      FAMILY HISTORY:  FH: cataracts (Child)        Social History:  Alochol:   Smoking:   Drug Use:   Marital Status:     I&O's Detail    05 Nov 2021 07:01  -  06 Nov 2021 07:00  --------------------------------------------------------  IN:    Diltiazem: 60 mL    Heparin Infusion: 102 mL    Oral Fluid: 200 mL  Total IN: 362 mL    OUT:    Voided (mL): 600 mL  Total OUT: 600 mL    Total NET: -238 mL          PHYSICAL EXAMINATION:  -----------------------------  T(C): 36.4 (11-06-21 @ 08:40), Max: 36.7 (11-05-21 @ 22:34)  HR: 108 (11-06-21 @ 09:20) (90 - 140)  BP: 124/74 (11-06-21 @ 09:20) (106/78 - 167/92)  RR: 19 (11-06-21 @ 09:20) (15 - 22)  SpO2: 95% (11-06-21 @ 09:20) (93% - 99%)  Wt(kg): --    11-05 @ 07:01  -  11-06 @ 07:00  --------------------------------------------------------  IN:    Diltiazem: 60 mL    Heparin Infusion: 102 mL    Oral Fluid: 200 mL  Total IN: 362 mL    OUT:    Voided (mL): 600 mL  Total OUT: 600 mL    Total NET: -238 mL        Height (cm): 182.9 (11-05 @ 22:34)  Weight (kg): 95 (11-05 @ 22:34)  BMI (kg/m2): 28.4 (11-05 @ 22:34)  BSA (m2): 2.17 (11-05 @ 22:34)    Constitutional: well developed, normal appearance, well groomed, well nourished, no deformities and no acute distress.   Eyes: the conjunctiva exhibited no abnormalities and the eyelids demonstrated no xanthelasmas.   HEENT: normal oral mucosa, no oral pallor and no oral cyanosis.   Neck: normal jugular venous A waves present, normal jugular venous V waves present and no jugular venous voss A waves.   Pulmonary: no respiratory distress, normal respiratory rhythm and effort, no accessory muscle use and lungs were clear to auscultation bilaterally.   Cardiovascular: heart rate and rhythm were irreg/irreg, normal S1 and S2 and no murmur, gallop, rub, heave or thrill are present.   Musculoskeletal: the gait could not be assessed.   Extremities: no clubbing of the fingernails, no localized cyanosis, no petechial hemorrhages and no ischemic changes.   Skin: normal skin color and pigmentation, no rash, no venous stasis, no skin lesions, no skin ulcer and no xanthoma was observed.   Psychiatric: oriented to person, place, and time, the affect was normal, the mood was normal and not feeling anxious.     LABS:   --------  11-06    138  |  104  |  13  ----------------------------<  173<H>  3.7   |  25  |  0.85    Ca    9.7      06 Nov 2021 06:26  Phos  2.7     11-06  Mg     2.2     11-06    TPro  7.1  /  Alb  3.4  /  TBili  0.5  /  DBili  x   /  AST  15  /  ALT  28  /  AlkPhos  75  11-05                         14.3   7.84  )-----------( 134      ( 06 Nov 2021 06:26 )             42.6     PT/INR - ( 05 Nov 2021 22:54 )   PT: 11.8 sec;   INR: 0.97 ratio         PTT - ( 06 Nov 2021 06:26 )  PTT:148.1 sec              RADIOLOGY:  -----------------        ECG: A-Fib RVR, low voltage, aberrant beat, NSST-T wave changes
Patient is a 88y old  Male who presents with a chief complaint of   PAST MEDICAL & SURGICAL HISTORY:  Hypertension    BPH (benign prostatic hyperplasia)    Benign enlargement of prostate    S/P cataract surgery  4 years ago    S/P TURP      WINIFRED CHERRY   88y    Male        Review of Systems:    Palpitations                    All other ROS are negative.    Allergies    No Known Allergies    Intolerances      Weight (kg): 95 (11-05-21 @ 22:34)  BMI (kg/m2): 28.4 (11-05-21 @ 22:34)    ICU Vital Signs Last 24 Hrs  T(C): 36.7 (05 Nov 2021 22:34), Max: 36.7 (05 Nov 2021 22:34)  T(F): 98.1 (05 Nov 2021 22:34), Max: 98.1 (05 Nov 2021 22:34)  HR: 124 (05 Nov 2021 23:43) (112 - 140)  BP: 167/92 (05 Nov 2021 23:43) (122/75 - 167/92)  BP(mean): --  ABP: --  ABP(mean): --  RR: 20 (05 Nov 2021 23:43) (18 - 22)  SpO2: 94% (05 Nov 2021 23:43) (94% - 99%)    Physical Examination:    General: NAD    HEENT: no JVD    PULM: clear lungs    CVS: s1 s2 irreg tachy    ABD: soft NT    EXT: no edema      SKIN: warm    Neuro: [place] otherwise NF          LABS:                        14.6   7.79  )-----------( 138      ( 05 Nov 2021 22:54 )             43.1     11-05    135  |  99  |  19  ----------------------------<  172<H>  3.4<L>   |  27  |  0.93    Ca    9.7      05 Nov 2021 22:54  Mg     1.8     11-05    TPro  7.1  /  Alb  3.4  /  TBili  0.5  /  DBili  x   /  AST  15  /  ALT  28  /  AlkPhos  75  11-05          CAPILLARY BLOOD GLUCOSE        PT/INR - ( 05 Nov 2021 22:54 )   PT: 11.8 sec;   INR: 0.97 ratio         PTT - ( 05 Nov 2021 22:54 )  PTT:31.0 sec    CULTURES:      Medications:  MEDICATIONS  (STANDING):  diltiazem Infusion 5 mG/Hr (5 mL/Hr) IV Continuous <Continuous>    MEDICATIONS  (PRN):        EKG   a fib RVR  NS st changes.        RADIOLOGY/IMAGING/ECHO    CXR clear     Assessment/Plan:    88M hx HTN BPH  DM (2) TIA     New onset a fib with RVR.  By hx seems to be in the last 48 hrs  Low K+ and Mg++  was on HCTZ      Full AC  UFH tonight DOAC tomorrow.    2D echo    Diltiazem infusion for rate control.    Aggressive electrolyte replacement.

## 2021-11-06 NOTE — DIETITIAN INITIAL EVALUATION ADULT. - OTHER INFO
Per H&P, Pt is a 89 y/o M with PMHx of HTN, DM type 2, Nephrolithiasis s/p Lithotripsy, Renal Mass s/p cryo ablation, and BPH s/p TURP who presented with 2 days history of intermittent episodes of fast irregular heart beats, tonight he found his heart rate in the two hundred's range on his home monitor, so he got concerned and called his daughter who brought him to the hospital. The episodes are of sudden onset & offset, worst one was while he was resting watching TV, not associated with chest pain, SOB, diaphoresis, dizziness, nausea, or vomiting. At the ED he was found to be in A. Fib with RVR, received Diltiazem 10 mg IVP X2 doses by ED team without a response, so they started him on Diltiazem infusion. Patient denies any similar episodes in the past.     Pt seen for nutrition assessment secondary to [facility] length of stay policy. Pt visited while resting comfortably in bed. Pt reports good appetite, spoke to pt's nurse who states that pt has good po intake. PO intake 80% of meals per RN documentation. Pt feeds self, w/ minimal assistance with tray set-up, reports no chewing/swallowing difficulties w/ current diet. No food allergies. Denies N/V/D/C. + BM two days ago prior to admission (11/4) per pt report. CBW on admission 209#. Denies any recent wt changes, reports that his weight has been consistent. BL foot (+1) edema noted. Skin intact. PTA pt reports good appetite, pt lives at home w/ wife who prepares his meals. Pt states that he typically consumes 2-3 meals/day, which consist of chicken, fish, fruits and vegetables. Does not consume juice or soda. Pt takes vitamin C and D supplements at home. Pt w/ T2DM, A1c of 7.3%. Does not check BS levels at home, pt states that he was a "borderline diabetic". Pt unfamiliar w/ sources of carbohydrates. Provided basic DM diet education and discussed sources of carbohydrates. Pt would benefit from ongoing DM diet education. Pt presently on consistent carbohydrate, DASH/TLC diet. RD will continue to follow-up and monitor pt's nutritional status.

## 2021-11-06 NOTE — DIETITIAN INITIAL EVALUATION ADULT. - PERTINENT MEDS FT
MEDICATIONS  (STANDING):  aspirin enteric coated 81 milliGRAM(s) Oral daily  dextrose 40% Gel 15 Gram(s) Oral once  dextrose 5%. 1000 milliLiter(s) (50 mL/Hr) IV Continuous <Continuous>  dextrose 5%. 1000 milliLiter(s) (100 mL/Hr) IV Continuous <Continuous>  dextrose 50% Injectable 25 Gram(s) IV Push once  dextrose 50% Injectable 12.5 Gram(s) IV Push once  dextrose 50% Injectable 25 Gram(s) IV Push once  diltiazem Infusion 5 mG/Hr (5 mL/Hr) IV Continuous <Continuous>  glucagon  Injectable 1 milliGRAM(s) IntraMuscular once  heparin  Infusion.  Unit(s)/Hr (17 mL/Hr) IV Continuous <Continuous>  influenza  Vaccine (HIGH DOSE) 0.7 milliLiter(s) IntraMuscular once  insulin glargine Injectable (LANTUS) 10 Unit(s) SubCutaneous every morning  insulin lispro (ADMELOG) corrective regimen sliding scale   SubCutaneous three times a day before meals  insulin lispro (ADMELOG) corrective regimen sliding scale   SubCutaneous at bedtime  losartan 100 milliGRAM(s) Oral daily  metoprolol tartrate 25 milliGRAM(s) Oral two times a day    MEDICATIONS  (PRN):  acetaminophen     Tablet .. 650 milliGRAM(s) Oral every 6 hours PRN Temp greater or equal to 38C (100.4F), Mild Pain (1 - 3)  heparin   Injectable 7500 Unit(s) IV Push every 6 hours PRN For aPTT less than 40  heparin   Injectable 3500 Unit(s) IV Push every 6 hours PRN For aPTT between 40 - 57  polyethylene glycol 3350 17 Gram(s) Oral daily PRN Constipation

## 2021-11-06 NOTE — CONSULT NOTE ADULT - ASSESSMENT
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

## 2021-11-06 NOTE — PROGRESS NOTE ADULT - SUBJECTIVE AND OBJECTIVE BOX
88y  Male  No Known Allergies    Patient is a 88y old  Male who presents with a chief complaint of Palpitations    HPI:  This is an 89 y/o M with PMH of HTN, DM type 2, Nephrolithiasis s/p Lithotripsy, Renal Mass s/p cryo ablation, and BPH s/p TURP who presented with 2 days history of intermittent episodes of fast irregular heart beats, tonight he found his heart rate in the two hundred's range on his home monitor, so he got concerned and called his daughter who brought him to the hospital. The episodes are of sudden onset & offset, worst one was while he was resting watching TV, not associated with chest pain, SOB, diaphoresis, dizziness, nausea, or vomiting. At the ED he was found to be in A. Fib with RVR, received Diltiazem 10 mg IVP X2 doses by ED team without a response, so they started him on Diltiazem infusion. Patient denies any similar episodes in the past.      PAST MEDICAL & SURGICAL HISTORY:  Hypertension  BPH (benign prostatic hyperplasia)  DM (diabetes mellitus)  2019 novel coronavirus disease (COVID-19)  Benign enlargement of prostate  S/P cataract surgery  4 years ago  S/P TURP    FAMILY HISTORY:  FH: cataracts (Child)    Vital Signs Last 24 Hrs  T(C): 36.6 (06 Nov 2021 20:35), Max: 36.7 (05 Nov 2021 22:34)  T(F): 97.9 (06 Nov 2021 20:35), Max: 98.1 (05 Nov 2021 22:34)  HR: 99 (06 Nov 2021 18:00) (58 - 140)  BP: 122/77 (06 Nov 2021 18:00) (97/79 - 167/92)  BP(mean): 92 (06 Nov 2021 18:00) (72 - 110)  RR: 9 (06 Nov 2021 18:00) (9 - 28)  SpO2: 97% (06 Nov 2021 18:00) (93% - 99%)    I&O's Summary  05 ov 2021 07:01  -  06 Nov 2021 07:00  --------------------------------------------------------  IN: 362 mL / OUT: 600 mL / NET: -238 mL    06 Nov 2021 08:01  -  06 Nov 2021 22:25  --------------------------------------------------------  IN: 234 mL / OUT: 1100 mL / NET: -866 mL      LABS  11-06  138  |  104  |  13  ----------------------------<  173<H>  3.7   |  25  |  0.85  Ca    9.7      06 Nov 2021 06:26  Phos  2.7     11-06  Mg     2.2     11-06  TPro  7.1  /  Alb  3.4  /  TBili  0.5  /  DBili  x   /  AST  15  /  ALT  28  /  AlkPhos  75  11-05                       14.3   7.84  )-----------( 134      ( 06 Nov 2021 06:26 )             42.6     PT/INR - ( 05 Nov 2021 22:54 )   PT: 11.8 sec;   INR: 0.97 ratio     PTT - ( 06 Nov 2021 15:19 )  PTT:138.1 sec  LIVER FUNCTIONS - ( 05 Nov 2021 22:54 )  Alb: 3.4 g/dL / Pro: 7.1 g/dL / ALK PHOS: 75 U/L / ALT: 28 U/L DA / AST: 15 U/L / GGT: x           CAPILLARY BLOOD GLUCOSE  POCT Blood Glucose.: 157 mg/dL (06 Nov 2021 16:58)    MEDICATIONS  (STANDING):  aspirin enteric coated 81 milliGRAM(s) Oral daily  dextrose 40% Gel 15 Gram(s) Oral once  dextrose 5%. 1000 milliLiter(s) (50 mL/Hr) IV Continuous <Continuous>  dextrose 5%. 1000 milliLiter(s) (100 mL/Hr) IV Continuous <Continuous>  dextrose 50% Injectable 25 Gram(s) IV Push once  dextrose 50% Injectable 12.5 Gram(s) IV Push once  dextrose 50% Injectable 25 Gram(s) IV Push once  diltiazem Infusion 5 mG/Hr (5 mL/Hr) IV Continuous <Continuous>  glucagon  Injectable 1 milliGRAM(s) IntraMuscular once  heparin  Infusion.  Unit(s)/Hr (17 mL/Hr) IV Continuous <Continuous>  influenza  Vaccine (HIGH DOSE) 0.7 milliLiter(s) IntraMuscular once  insulin glargine Injectable (LANTUS) 10 Unit(s) SubCutaneous every morning  insulin lispro (ADMELOG) corrective regimen sliding scale   SubCutaneous three times a day before meals  insulin lispro (ADMELOG) corrective regimen sliding scale   SubCutaneous at bedtime  losartan 100 milliGRAM(s) Oral daily  metoprolol tartrate 25 milliGRAM(s) Oral two times a day      REVIEW OF SYSTEMS:    CONSTITUTIONAL: No fever, weight loss, or fatigue  EYES: No eye pain, visual disturbances, or discharge  ENMT:  No difficulty hearing, tinnitus, vertigo; No sinus or throat pain  NECK: No pain or stiffness  BREASTS: No pain, masses, or nipple discharge  RESPIRATORY: No cough, wheezing, chills or hemoptysis; No shortness of breath  CARDIOVASCULAR: No chest pain, palpitations, dizziness, or leg swelling  GASTROINTESTINAL: No abdominal or epigastric pain. No nausea, vomiting, or hematemesis; No diarrhea or constipation. No melena or hematochezia.  GENITOURINARY: No dysuria, frequency, hematuria, or incontinence  NEUROLOGICAL: No headaches, memory loss, loss of strength, numbness, or tremors  SKIN: No itching, burning, rashes, or lesions   LYMPH NODES: No enlarged glands  ENDOCRINE: No heat or cold intolerance; No hair loss  MUSCULOSKELETAL: No joint pain or swelling; No muscle, back, or extremity pain  PSYCHIATRIC: No depression, anxiety, mood swings, or difficulty sleeping  HEME/LYMPH: No easy bruising, or bleeding gums  ALLERY AND IMMUNOLOGIC: No hives or eczema      Physicial Exam:     Constitutional: NAD, well-groomed, well-developed  HEENT: PERRLA, EOMI, no drainage or redness  Neck: No bruits; no thyromegaly or nodules,  No JVD  Back: Normal spine flexure, No CVA tenderness, No deformity or limitation of movement  Respiratory: Breath Sounds equal & clear to percussion & auscultation, no accessory muscle use  Cardiovascular: Regular rate & rhythm, normal S1, S2; no murmurs, gallops or rubs; no S3, S4  Gastrointestinal: Soft, non-tender, non distended no hepatosplenomegaly, normal bowel sounds  Extremities: No peripheral edema, No cyanosis, clubbing   Vascular: Equal and normal pulses: 2+ peripheral pulses throughout  Neurological: GCS:    A&O x 3; no sensory, motor  deficits, normal reflexes  Psychiatric: Normal mood, normal affect  Musculoskeletal: No joint pain, swelling or deformity; no limitation of movement  Skin: No rashes

## 2021-11-07 LAB
ALBUMIN SERPL ELPH-MCNC: 3 G/DL — LOW (ref 3.3–5)
ALP SERPL-CCNC: 67 U/L — SIGNIFICANT CHANGE UP (ref 30–120)
ALT FLD-CCNC: 27 U/L DA — SIGNIFICANT CHANGE UP (ref 10–60)
ANION GAP SERPL CALC-SCNC: 8 MMOL/L — SIGNIFICANT CHANGE UP (ref 5–17)
APTT BLD: 81.2 SEC — HIGH (ref 27.5–35.5)
AST SERPL-CCNC: 15 U/L — SIGNIFICANT CHANGE UP (ref 10–40)
BILIRUB SERPL-MCNC: 0.7 MG/DL — SIGNIFICANT CHANGE UP (ref 0.2–1.2)
BUN SERPL-MCNC: 14 MG/DL — SIGNIFICANT CHANGE UP (ref 7–23)
CALCIUM SERPL-MCNC: 9.6 MG/DL — SIGNIFICANT CHANGE UP (ref 8.4–10.5)
CHLORIDE SERPL-SCNC: 105 MMOL/L — SIGNIFICANT CHANGE UP (ref 96–108)
CO2 SERPL-SCNC: 26 MMOL/L — SIGNIFICANT CHANGE UP (ref 22–31)
COVID-19 NUCLEOCAPSID GAM AB INTERP: POSITIVE
COVID-19 NUCLEOCAPSID TOTAL GAM ANTIBODY RESULT: 79.8 INDEX — HIGH
COVID-19 SPIKE DOMAIN AB INTERP: POSITIVE
COVID-19 SPIKE DOMAIN ANTIBODY RESULT: >250 U/ML — HIGH
CREAT SERPL-MCNC: 0.79 MG/DL — SIGNIFICANT CHANGE UP (ref 0.5–1.3)
GLUCOSE SERPL-MCNC: 143 MG/DL — HIGH (ref 70–99)
HCT VFR BLD CALC: 42.7 % — SIGNIFICANT CHANGE UP (ref 39–50)
HGB BLD-MCNC: 14.1 G/DL — SIGNIFICANT CHANGE UP (ref 13–17)
MAGNESIUM SERPL-MCNC: 2 MG/DL — SIGNIFICANT CHANGE UP (ref 1.6–2.6)
MCHC RBC-ENTMCNC: 30.4 PG — SIGNIFICANT CHANGE UP (ref 27–34)
MCHC RBC-ENTMCNC: 33 GM/DL — SIGNIFICANT CHANGE UP (ref 32–36)
MCV RBC AUTO: 92 FL — SIGNIFICANT CHANGE UP (ref 80–100)
NRBC # BLD: 0 /100 WBCS — SIGNIFICANT CHANGE UP (ref 0–0)
PLATELET # BLD AUTO: 130 K/UL — LOW (ref 150–400)
POTASSIUM SERPL-MCNC: 3.6 MMOL/L — SIGNIFICANT CHANGE UP (ref 3.5–5.3)
POTASSIUM SERPL-SCNC: 3.6 MMOL/L — SIGNIFICANT CHANGE UP (ref 3.5–5.3)
PROT SERPL-MCNC: 6.3 G/DL — SIGNIFICANT CHANGE UP (ref 6–8.3)
RBC # BLD: 4.64 M/UL — SIGNIFICANT CHANGE UP (ref 4.2–5.8)
RBC # FLD: 13.4 % — SIGNIFICANT CHANGE UP (ref 10.3–14.5)
SARS-COV-2 IGG+IGM SERPL QL IA: 79.8 INDEX — HIGH
SARS-COV-2 IGG+IGM SERPL QL IA: >250 U/ML — HIGH
SARS-COV-2 IGG+IGM SERPL QL IA: POSITIVE
SARS-COV-2 IGG+IGM SERPL QL IA: POSITIVE
SODIUM SERPL-SCNC: 139 MMOL/L — SIGNIFICANT CHANGE UP (ref 135–145)
WBC # BLD: 6.48 K/UL — SIGNIFICANT CHANGE UP (ref 3.8–10.5)
WBC # FLD AUTO: 6.48 K/UL — SIGNIFICANT CHANGE UP (ref 3.8–10.5)

## 2021-11-07 PROCEDURE — 99233 SBSQ HOSP IP/OBS HIGH 50: CPT

## 2021-11-07 RX ORDER — LOSARTAN POTASSIUM 100 MG/1
25 TABLET, FILM COATED ORAL DAILY
Refills: 0 | Status: DISCONTINUED | OUTPATIENT
Start: 2021-11-08 | End: 2021-11-08

## 2021-11-07 RX ORDER — LOSARTAN POTASSIUM 100 MG/1
50 TABLET, FILM COATED ORAL DAILY
Refills: 0 | Status: DISCONTINUED | OUTPATIENT
Start: 2021-11-07 | End: 2021-11-07

## 2021-11-07 RX ORDER — POTASSIUM CHLORIDE 20 MEQ
40 PACKET (EA) ORAL ONCE
Refills: 0 | Status: COMPLETED | OUTPATIENT
Start: 2021-11-07 | End: 2021-11-07

## 2021-11-07 RX ORDER — DILTIAZEM HCL 120 MG
240 CAPSULE, EXT RELEASE 24 HR ORAL DAILY
Refills: 0 | Status: DISCONTINUED | OUTPATIENT
Start: 2021-11-07 | End: 2021-11-08

## 2021-11-07 RX ADMIN — LOSARTAN POTASSIUM 50 MILLIGRAM(S): 100 TABLET, FILM COATED ORAL at 10:40

## 2021-11-07 RX ADMIN — INSULIN GLARGINE 10 UNIT(S): 100 INJECTION, SOLUTION SUBCUTANEOUS at 07:57

## 2021-11-07 RX ADMIN — Medication 25 MILLIGRAM(S): at 17:06

## 2021-11-07 RX ADMIN — Medication 81 MILLIGRAM(S): at 10:41

## 2021-11-07 RX ADMIN — Medication 40 MILLIEQUIVALENT(S): at 11:24

## 2021-11-07 RX ADMIN — HEPARIN SODIUM 1100 UNIT(S)/HR: 5000 INJECTION INTRAVENOUS; SUBCUTANEOUS at 04:26

## 2021-11-07 RX ADMIN — Medication 4: at 11:49

## 2021-11-07 RX ADMIN — Medication 5 MG/HR: at 03:23

## 2021-11-07 RX ADMIN — Medication 240 MILLIGRAM(S): at 10:40

## 2021-11-07 RX ADMIN — Medication 25 MILLIGRAM(S): at 06:06

## 2021-11-07 RX ADMIN — Medication 2: at 07:56

## 2021-11-07 RX ADMIN — Medication 2: at 17:05

## 2021-11-07 NOTE — PROGRESS NOTE ADULT - SUBJECTIVE AND OBJECTIVE BOX
Patient is a 88y Male with a known history of :  New onset atrial fibrillation [I48.91]    Hypokalemia [E87.6]    DM2 (diabetes mellitus, type 2) [E11.9]    HTN (hypertension) [I10]    Need for prophylactic measure [Z29.9]      HPI:  This is an 89 y/o M with PMH of HTN, DM type 2, Nephrolithiasis s/p Lithotripsy, Renal Mass s/p cryo ablation, and BPH s/p TURP who presented with 2 days history of intermittent episodes of fast irregular heart beats, tonight he found his heart rate in the two hundred's range on his home monitor, so he got concerned and called his daughter who brought him to the hospital. The episodes are of sudden onset & offset, worst one was while he was resting watching TV, not associated with chest pain, SOB, diaphoresis, dizziness, nausea, or vomiting. At the ED he was found to be in A. Fib with RVR, received Diltiazem 10 mg IVP X2 doses by ED team without a response, so they started him on Diltiazem infusion. Patient denies any similar episodes in the past.   (05 Nov 2021 23:42)      REVIEW OF SYSTEMS:    CONSTITUTIONAL: No fever, weight loss, or fatigue  EYES: No eye pain, visual disturbances, or discharge  ENMT:  No difficulty hearing, tinnitus, vertigo; No sinus or throat pain  NECK: No pain or stiffness  BREASTS: No pain, masses, or nipple discharge  RESPIRATORY: No cough, wheezing, chills or hemoptysis; No shortness of breath  CARDIOVASCULAR: No chest pain, palpitations, dizziness, or leg swelling  GASTROINTESTINAL: No abdominal or epigastric pain. No nausea, vomiting, or hematemesis; No diarrhea or constipation. No melena or hematochezia.  GENITOURINARY: No dysuria, frequency, hematuria, or incontinence  NEUROLOGICAL: No headaches, memory loss, loss of strength, numbness, or tremors  SKIN: No itching, burning, rashes, or lesions   LYMPH NODES: No enlarged glands  ENDOCRINE: No heat or cold intolerance; No hair loss  MUSCULOSKELETAL: No joint pain or swelling; No muscle, back, or extremity pain  PSYCHIATRIC: No depression, anxiety, mood swings, or difficulty sleeping  HEME/LYMPH: No easy bruising, or bleeding gums  ALLERGY AND IMMUNOLOGIC: No hives or eczema    MEDICATIONS  (STANDING):  aspirin enteric coated 81 milliGRAM(s) Oral daily  dextrose 40% Gel 15 Gram(s) Oral once  dextrose 5%. 1000 milliLiter(s) (50 mL/Hr) IV Continuous <Continuous>  dextrose 5%. 1000 milliLiter(s) (100 mL/Hr) IV Continuous <Continuous>  dextrose 50% Injectable 25 Gram(s) IV Push once  dextrose 50% Injectable 12.5 Gram(s) IV Push once  dextrose 50% Injectable 25 Gram(s) IV Push once  diltiazem    milliGRAM(s) Oral daily  diltiazem Infusion 5 mG/Hr (5 mL/Hr) IV Continuous <Continuous>  glucagon  Injectable 1 milliGRAM(s) IntraMuscular once  heparin  Infusion.  Unit(s)/Hr (17 mL/Hr) IV Continuous <Continuous>  influenza  Vaccine (HIGH DOSE) 0.7 milliLiter(s) IntraMuscular once  insulin glargine Injectable (LANTUS) 10 Unit(s) SubCutaneous every morning  insulin lispro (ADMELOG) corrective regimen sliding scale   SubCutaneous three times a day before meals  insulin lispro (ADMELOG) corrective regimen sliding scale   SubCutaneous at bedtime  losartan 50 milliGRAM(s) Oral daily  metoprolol tartrate 25 milliGRAM(s) Oral two times a day    MEDICATIONS  (PRN):  acetaminophen     Tablet .. 650 milliGRAM(s) Oral every 6 hours PRN Temp greater or equal to 38C (100.4F), Mild Pain (1 - 3)  heparin   Injectable 7500 Unit(s) IV Push every 6 hours PRN For aPTT less than 40  heparin   Injectable 3500 Unit(s) IV Push every 6 hours PRN For aPTT between 40 - 57  polyethylene glycol 3350 17 Gram(s) Oral daily PRN Constipation      ALLERGIES: No Known Allergies      FAMILY HISTORY:  FH: cataracts (Child)        Social history:  Alochol:   Smoking:   Drug Use:   Marital Status:     PHYSICAL EXAMINATION:  -----------------------------  T(C): 36.5 (11-07-21 @ 00:00), Max: 36.6 (11-06-21 @ 20:35)  HR: 80 (11-07-21 @ 09:00) (58 - 120)  BP: 98/68 (11-07-21 @ 09:00) (97/79 - 137/92)  RR: 22 (11-07-21 @ 09:00) (0 - 29)  SpO2: 96% (11-07-21 @ 09:00) (93% - 98%)  Wt(kg): --    11-06 @ 08:01  -  11-07 @ 07:00  --------------------------------------------------------  IN:    Diltiazem: 135 mL    Heparin Infusion: 244 mL  Total IN: 379 mL    OUT:    Voided (mL): 2150 mL  Total OUT: 2150 mL    Total NET: -1771 mL            Constitutional: well developed, normal appearance, well groomed, well nourished, no deformities and no acute distress.   Eyes: the conjunctiva exhibited no abnormalities and the eyelids demonstrated no xanthelasmas.   HEENT: normal oral mucosa, no oral pallor and no oral cyanosis.   Neck: normal jugular venous A waves present, normal jugular venous V waves present and no jugular venous voss A waves.   Pulmonary: no respiratory distress, normal respiratory rhythm and effort, no accessory muscle use and lungs were clear to auscultation bilaterally. Anteriorly  Cardiovascular: heart rate and rhythm were irreg/irreg, normal S1 and S2 and no murmur, gallop, rub, heave or thrill are present. .   Musculoskeletal: the gait could not be assessed.   Extremities: no clubbing of the fingernails, no localized cyanosis, no petechial hemorrhages and no ischemic changes.   Skin: normal skin color and pigmentation, no rash, no venous stasis, no skin lesions, no skin ulcer and no xanthoma was observed.   Psychiatric: oriented to person, place, and time, the affect was normal, the mood was normal and not feeling anxious.     LABS:   --------  11-07    139  |  105  |  14  ----------------------------<  143<H>  3.6   |  26  |  0.79    Ca    9.6      07 Nov 2021 06:42  Phos  2.7     11-06  Mg     2.0     11-07    TPro  6.3  /  Alb  3.0<L>  /  TBili  0.7  /  DBili  x   /  AST  15  /  ALT  27  /  AlkPhos  67  11-07                         14.1   6.48  )-----------( 130      ( 07 Nov 2021 06:42 )             42.7     PT/INR - ( 05 Nov 2021 22:54 )   PT: 11.8 sec;   INR: 0.97 ratio         PTT - ( 07 Nov 2021 04:03 )  PTT:81.2 sec              Radiology:

## 2021-11-07 NOTE — PROGRESS NOTE ADULT - SUBJECTIVE AND OBJECTIVE BOX
Patient is a 88y old  Male who presents with a chief complaint of palpitations.       INTERVAL HPI/OVERNIGHT EVENTS: Pt was started on cardizem 240mg po daily by cardio today and cardizem drip discontinued in the afternoon. Pt states he is feeling better. Denies palpitations, CP, SOB, abd pain.     MEDICATIONS  (STANDING):  aspirin enteric coated 81 milliGRAM(s) Oral daily  dextrose 40% Gel 15 Gram(s) Oral once  dextrose 5%. 1000 milliLiter(s) (50 mL/Hr) IV Continuous <Continuous>  dextrose 5%. 1000 milliLiter(s) (100 mL/Hr) IV Continuous <Continuous>  dextrose 50% Injectable 25 Gram(s) IV Push once  dextrose 50% Injectable 12.5 Gram(s) IV Push once  dextrose 50% Injectable 25 Gram(s) IV Push once  diltiazem    milliGRAM(s) Oral daily  glucagon  Injectable 1 milliGRAM(s) IntraMuscular once  heparin  Infusion.  Unit(s)/Hr (17 mL/Hr) IV Continuous <Continuous>  influenza  Vaccine (HIGH DOSE) 0.7 milliLiter(s) IntraMuscular once  insulin glargine Injectable (LANTUS) 10 Unit(s) SubCutaneous every morning  insulin lispro (ADMELOG) corrective regimen sliding scale   SubCutaneous three times a day before meals  insulin lispro (ADMELOG) corrective regimen sliding scale   SubCutaneous at bedtime  losartan 50 milliGRAM(s) Oral daily  metoprolol tartrate 25 milliGRAM(s) Oral two times a day    MEDICATIONS  (PRN):  acetaminophen     Tablet .. 650 milliGRAM(s) Oral every 6 hours PRN Temp greater or equal to 38C (100.4F), Mild Pain (1 - 3)  heparin   Injectable 7500 Unit(s) IV Push every 6 hours PRN For aPTT less than 40  heparin   Injectable 3500 Unit(s) IV Push every 6 hours PRN For aPTT between 40 - 57  polyethylene glycol 3350 17 Gram(s) Oral daily PRN Constipation      Allergies    No Known Allergies    Intolerances        REVIEW OF SYSTEMS:  CONSTITUTIONAL: No fever or chills  HEENT:  No headache, no sore throat  RESPIRATORY: No cough, wheezing, or shortness of breath  CARDIOVASCULAR: No chest pain, palpitations  GASTROINTESTINAL: No abd pain, nausea, vomiting, or diarrhea  GENITOURINARY: No dysuria, frequency, or hematuria  NEUROLOGICAL: no focal weakness or dizziness  MUSCULOSKELETAL: no myalgias     Vital Signs Last 24 Hrs  T(C): 36.1 (07 Nov 2021 16:25), Max: 36.6 (06 Nov 2021 20:35)  T(F): 97 (07 Nov 2021 16:25), Max: 97.9 (06 Nov 2021 20:35)  HR: 90 (07 Nov 2021 14:00) (76 - 120)  BP: 103/61 (07 Nov 2021 14:00) (94/64 - 137/92)  BP(mean): 73 (07 Nov 2021 14:00) (72 - 106)  RR: 20 (07 Nov 2021 14:00) (0 - 29)  SpO2: 96% (07 Nov 2021 14:00) (93% - 98%)    PHYSICAL EXAM:  GENERAL: NAD  HEENT:  anicteric, moist mucous membranes  CHEST/LUNG:  CTA b/l, no rales, wheezes, or rhonchi  HEART:  irregular at 88bpm, S1, S2  ABDOMEN:  BS+, soft, nontender, nondistended  EXTREMITIES: no cyanosis or calf tenderness  NERVOUS SYSTEM: answers questions and follows commands appropriately    LABS:                        14.1   6.48  )-----------( 130      ( 07 Nov 2021 06:42 )             42.7     CBC Full  -  ( 07 Nov 2021 06:42 )  WBC Count : 6.48 K/uL  Hemoglobin : 14.1 g/dL  Hematocrit : 42.7 %  Platelet Count - Automated : 130 K/uL  Mean Cell Volume : 92.0 fl  Mean Cell Hemoglobin : 30.4 pg  Mean Cell Hemoglobin Concentration : 33.0 gm/dL  Auto Neutrophil # : x  Auto Lymphocyte # : x  Auto Monocyte # : x  Auto Eosinophil # : x  Auto Basophil # : x  Auto Neutrophil % : x  Auto Lymphocyte % : x  Auto Monocyte % : x  Auto Eosinophil % : x  Auto Basophil % : x    07 Nov 2021 06:42    139    |  105    |  14     ----------------------------<  143    3.6     |  26     |  0.79     Ca    9.6        07 Nov 2021 06:42  Mg     2.0       07 Nov 2021 06:42    TPro  6.3    /  Alb  3.0    /  TBili  0.7    /  DBili  x      /  AST  15     /  ALT  27     /  AlkPhos  67     07 Nov 2021 06:42    PT/INR - ( 05 Nov 2021 22:54 )   PT: 11.8 sec;   INR: 0.97 ratio         PTT - ( 07 Nov 2021 04:03 )  PTT:81.2 sec    CAPILLARY BLOOD GLUCOSE      POCT Blood Glucose.: 159 mg/dL (07 Nov 2021 16:45)  POCT Blood Glucose.: 220 mg/dL (07 Nov 2021 11:41)  POCT Blood Glucose.: 152 mg/dL (07 Nov 2021 07:23)  POCT Blood Glucose.: 142 mg/dL (06 Nov 2021 22:25)          RADIOLOGY & ADDITIONAL TESTS:    Personally reviewed.     Consultant(s) Notes Reviewed:  [x] YES  [ ] NO     Patient is a 88y old  Male who presents with a chief complaint of palpitations.        INTERVAL HPI/OVERNIGHT EVENTS: Pt was started on cardizem 240mg po daily by cardio today and cardizem drip discontinued in the afternoon. Pt states he is feeling better. Denies palpitations, CP, SOB, abd pain.     MEDICATIONS  (STANDING):  aspirin enteric coated 81 milliGRAM(s) Oral daily  dextrose 40% Gel 15 Gram(s) Oral once  dextrose 5%. 1000 milliLiter(s) (50 mL/Hr) IV Continuous <Continuous>  dextrose 5%. 1000 milliLiter(s) (100 mL/Hr) IV Continuous <Continuous>  dextrose 50% Injectable 25 Gram(s) IV Push once  dextrose 50% Injectable 12.5 Gram(s) IV Push once  dextrose 50% Injectable 25 Gram(s) IV Push once  diltiazem    milliGRAM(s) Oral daily  glucagon  Injectable 1 milliGRAM(s) IntraMuscular once  heparin  Infusion.  Unit(s)/Hr (17 mL/Hr) IV Continuous <Continuous>  influenza  Vaccine (HIGH DOSE) 0.7 milliLiter(s) IntraMuscular once  insulin glargine Injectable (LANTUS) 10 Unit(s) SubCutaneous every morning  insulin lispro (ADMELOG) corrective regimen sliding scale   SubCutaneous three times a day before meals  insulin lispro (ADMELOG) corrective regimen sliding scale   SubCutaneous at bedtime  losartan 50 milliGRAM(s) Oral daily  metoprolol tartrate 25 milliGRAM(s) Oral two times a day    MEDICATIONS  (PRN):  acetaminophen     Tablet .. 650 milliGRAM(s) Oral every 6 hours PRN Temp greater or equal to 38C (100.4F), Mild Pain (1 - 3)  heparin   Injectable 7500 Unit(s) IV Push every 6 hours PRN For aPTT less than 40  heparin   Injectable 3500 Unit(s) IV Push every 6 hours PRN For aPTT between 40 - 57  polyethylene glycol 3350 17 Gram(s) Oral daily PRN Constipation      Allergies    No Known Allergies    Intolerances        REVIEW OF SYSTEMS:  CONSTITUTIONAL: No fever or chills  HEENT:  No headache, no sore throat  RESPIRATORY: No cough, wheezing, or shortness of breath  CARDIOVASCULAR: No chest pain, palpitations  GASTROINTESTINAL: No abd pain, nausea, vomiting, or diarrhea  GENITOURINARY: No dysuria, frequency, or hematuria  NEUROLOGICAL: no focal weakness or dizziness  MUSCULOSKELETAL: no myalgias     Vital Signs Last 24 Hrs  T(C): 36.1 (07 Nov 2021 16:25), Max: 36.6 (06 Nov 2021 20:35)  T(F): 97 (07 Nov 2021 16:25), Max: 97.9 (06 Nov 2021 20:35)  HR: 90 (07 Nov 2021 14:00) (76 - 120)  BP: 103/61 (07 Nov 2021 14:00) (94/64 - 137/92)  BP(mean): 73 (07 Nov 2021 14:00) (72 - 106)  RR: 20 (07 Nov 2021 14:00) (0 - 29)  SpO2: 96% (07 Nov 2021 14:00) (93% - 98%)    PHYSICAL EXAM:  GENERAL: NAD  HEENT:  anicteric, moist mucous membranes  CHEST/LUNG:  CTA b/l, no rales, wheezes, or rhonchi  HEART:  irregular at 88bpm, S1, S2  ABDOMEN:  BS+, soft, nontender, nondistended  EXTREMITIES: no cyanosis or calf tenderness  NERVOUS SYSTEM: answers questions and follows commands appropriately    LABS:                        14.1   6.48  )-----------( 130      ( 07 Nov 2021 06:42 )             42.7     CBC Full  -  ( 07 Nov 2021 06:42 )  WBC Count : 6.48 K/uL  Hemoglobin : 14.1 g/dL  Hematocrit : 42.7 %  Platelet Count - Automated : 130 K/uL  Mean Cell Volume : 92.0 fl  Mean Cell Hemoglobin : 30.4 pg  Mean Cell Hemoglobin Concentration : 33.0 gm/dL  Auto Neutrophil # : x  Auto Lymphocyte # : x  Auto Monocyte # : x  Auto Eosinophil # : x  Auto Basophil # : x  Auto Neutrophil % : x  Auto Lymphocyte % : x  Auto Monocyte % : x  Auto Eosinophil % : x  Auto Basophil % : x    07 Nov 2021 06:42    139    |  105    |  14     ----------------------------<  143    3.6     |  26     |  0.79     Ca    9.6        07 Nov 2021 06:42  Mg     2.0       07 Nov 2021 06:42    TPro  6.3    /  Alb  3.0    /  TBili  0.7    /  DBili  x      /  AST  15     /  ALT  27     /  AlkPhos  67     07 Nov 2021 06:42    PT/INR - ( 05 Nov 2021 22:54 )   PT: 11.8 sec;   INR: 0.97 ratio         PTT - ( 07 Nov 2021 04:03 )  PTT:81.2 sec    CAPILLARY BLOOD GLUCOSE      POCT Blood Glucose.: 159 mg/dL (07 Nov 2021 16:45)  POCT Blood Glucose.: 220 mg/dL (07 Nov 2021 11:41)  POCT Blood Glucose.: 152 mg/dL (07 Nov 2021 07:23)  POCT Blood Glucose.: 142 mg/dL (06 Nov 2021 22:25)          RADIOLOGY & ADDITIONAL TESTS:    Personally reviewed.     Consultant(s) Notes Reviewed:  [x] YES  [ ] NO

## 2021-11-07 NOTE — PROGRESS NOTE ADULT - TIME BILLING
Note written by attending, see above.  Time spent: 40min. More than 50% of the visit was spent counseling the patient on medical condition - afib with RVR, cardizem drip and transition to oral cardizem, AVN blockers, different types of anti-coagulants and need for A/C in pt's with afib.

## 2021-11-08 ENCOUNTER — TRANSCRIPTION ENCOUNTER (OUTPATIENT)
Age: 86
End: 2021-11-08

## 2021-11-08 VITALS — TEMPERATURE: 98 F

## 2021-11-08 LAB
ANION GAP SERPL CALC-SCNC: 8 MMOL/L — SIGNIFICANT CHANGE UP (ref 5–17)
APTT BLD: 77 SEC — HIGH (ref 27.5–35.5)
BUN SERPL-MCNC: 14 MG/DL — SIGNIFICANT CHANGE UP (ref 7–23)
CALCIUM SERPL-MCNC: 9.6 MG/DL — SIGNIFICANT CHANGE UP (ref 8.4–10.5)
CHLORIDE SERPL-SCNC: 107 MMOL/L — SIGNIFICANT CHANGE UP (ref 96–108)
CO2 SERPL-SCNC: 26 MMOL/L — SIGNIFICANT CHANGE UP (ref 22–31)
CREAT SERPL-MCNC: 0.8 MG/DL — SIGNIFICANT CHANGE UP (ref 0.5–1.3)
GLUCOSE SERPL-MCNC: 148 MG/DL — HIGH (ref 70–99)
HCT VFR BLD CALC: 41.1 % — SIGNIFICANT CHANGE UP (ref 39–50)
HGB BLD-MCNC: 13.3 G/DL — SIGNIFICANT CHANGE UP (ref 13–17)
MAGNESIUM SERPL-MCNC: 2.1 MG/DL — SIGNIFICANT CHANGE UP (ref 1.6–2.6)
MCHC RBC-ENTMCNC: 30 PG — SIGNIFICANT CHANGE UP (ref 27–34)
MCHC RBC-ENTMCNC: 32.4 GM/DL — SIGNIFICANT CHANGE UP (ref 32–36)
MCV RBC AUTO: 92.8 FL — SIGNIFICANT CHANGE UP (ref 80–100)
NRBC # BLD: 0 /100 WBCS — SIGNIFICANT CHANGE UP (ref 0–0)
PHOSPHATE SERPL-MCNC: 3 MG/DL — SIGNIFICANT CHANGE UP (ref 2.5–4.5)
PLATELET # BLD AUTO: 130 K/UL — LOW (ref 150–400)
POTASSIUM SERPL-MCNC: 3.7 MMOL/L — SIGNIFICANT CHANGE UP (ref 3.5–5.3)
POTASSIUM SERPL-SCNC: 3.7 MMOL/L — SIGNIFICANT CHANGE UP (ref 3.5–5.3)
RBC # BLD: 4.43 M/UL — SIGNIFICANT CHANGE UP (ref 4.2–5.8)
RBC # FLD: 13.4 % — SIGNIFICANT CHANGE UP (ref 10.3–14.5)
SODIUM SERPL-SCNC: 141 MMOL/L — SIGNIFICANT CHANGE UP (ref 135–145)
WBC # BLD: 6.56 K/UL — SIGNIFICANT CHANGE UP (ref 3.8–10.5)
WBC # FLD AUTO: 6.56 K/UL — SIGNIFICANT CHANGE UP (ref 3.8–10.5)

## 2021-11-08 PROCEDURE — 82962 GLUCOSE BLOOD TEST: CPT

## 2021-11-08 PROCEDURE — 99239 HOSP IP/OBS DSCHRG MGMT >30: CPT

## 2021-11-08 PROCEDURE — 71045 X-RAY EXAM CHEST 1 VIEW: CPT

## 2021-11-08 PROCEDURE — 99285 EMERGENCY DEPT VISIT HI MDM: CPT

## 2021-11-08 PROCEDURE — 85027 COMPLETE CBC AUTOMATED: CPT

## 2021-11-08 PROCEDURE — 86769 SARS-COV-2 COVID-19 ANTIBODY: CPT

## 2021-11-08 PROCEDURE — 85025 COMPLETE CBC W/AUTO DIFF WBC: CPT

## 2021-11-08 PROCEDURE — 85730 THROMBOPLASTIN TIME PARTIAL: CPT

## 2021-11-08 PROCEDURE — 93306 TTE W/DOPPLER COMPLETE: CPT

## 2021-11-08 PROCEDURE — 83036 HEMOGLOBIN GLYCOSYLATED A1C: CPT

## 2021-11-08 PROCEDURE — 96376 TX/PRO/DX INJ SAME DRUG ADON: CPT

## 2021-11-08 PROCEDURE — 36415 COLL VENOUS BLD VENIPUNCTURE: CPT

## 2021-11-08 PROCEDURE — 80053 COMPREHEN METABOLIC PANEL: CPT

## 2021-11-08 PROCEDURE — 93005 ELECTROCARDIOGRAM TRACING: CPT

## 2021-11-08 PROCEDURE — 96374 THER/PROPH/DIAG INJ IV PUSH: CPT

## 2021-11-08 PROCEDURE — 84100 ASSAY OF PHOSPHORUS: CPT

## 2021-11-08 PROCEDURE — 84443 ASSAY THYROID STIM HORMONE: CPT

## 2021-11-08 PROCEDURE — 85610 PROTHROMBIN TIME: CPT

## 2021-11-08 PROCEDURE — 83735 ASSAY OF MAGNESIUM: CPT

## 2021-11-08 PROCEDURE — 80048 BASIC METABOLIC PNL TOTAL CA: CPT

## 2021-11-08 PROCEDURE — 84484 ASSAY OF TROPONIN QUANT: CPT

## 2021-11-08 PROCEDURE — 87635 SARS-COV-2 COVID-19 AMP PRB: CPT

## 2021-11-08 RX ORDER — APIXABAN 2.5 MG/1
5 TABLET, FILM COATED ORAL ONCE
Refills: 0 | Status: COMPLETED | OUTPATIENT
Start: 2021-11-08 | End: 2021-11-08

## 2021-11-08 RX ORDER — LOSARTAN POTASSIUM 100 MG/1
1 TABLET, FILM COATED ORAL
Qty: 30 | Refills: 0
Start: 2021-11-08

## 2021-11-08 RX ORDER — METOPROLOL TARTRATE 50 MG
1 TABLET ORAL
Qty: 60 | Refills: 0
Start: 2021-11-08

## 2021-11-08 RX ORDER — ISOPROPYL ALCOHOL, BENZOCAINE .7; .06 ML/ML; ML/ML
1 SWAB TOPICAL
Qty: 100 | Refills: 1
Start: 2021-11-08 | End: 2021-12-27

## 2021-11-08 RX ORDER — METOPROLOL TARTRATE 50 MG
25 TABLET ORAL ONCE
Refills: 0 | Status: COMPLETED | OUTPATIENT
Start: 2021-11-08 | End: 2021-11-08

## 2021-11-08 RX ORDER — APIXABAN 2.5 MG/1
1 TABLET, FILM COATED ORAL
Qty: 60 | Refills: 0
Start: 2021-11-08

## 2021-11-08 RX ORDER — METOPROLOL TARTRATE 50 MG
50 TABLET ORAL
Refills: 0 | Status: DISCONTINUED | OUTPATIENT
Start: 2021-11-08 | End: 2021-11-08

## 2021-11-08 RX ORDER — DILTIAZEM HCL 120 MG
1 CAPSULE, EXT RELEASE 24 HR ORAL
Qty: 30 | Refills: 0
Start: 2021-11-08

## 2021-11-08 RX ORDER — APIXABAN 2.5 MG/1
5 TABLET, FILM COATED ORAL EVERY 12 HOURS
Refills: 0 | Status: DISCONTINUED | OUTPATIENT
Start: 2021-11-08 | End: 2021-11-08

## 2021-11-08 RX ADMIN — Medication 25 MILLIGRAM(S): at 09:22

## 2021-11-08 RX ADMIN — HEPARIN SODIUM 1100 UNIT(S)/HR: 5000 INJECTION INTRAVENOUS; SUBCUTANEOUS at 06:46

## 2021-11-08 RX ADMIN — APIXABAN 5 MILLIGRAM(S): 2.5 TABLET, FILM COATED ORAL at 09:22

## 2021-11-08 RX ADMIN — INSULIN GLARGINE 10 UNIT(S): 100 INJECTION, SOLUTION SUBCUTANEOUS at 07:55

## 2021-11-08 RX ADMIN — Medication 81 MILLIGRAM(S): at 12:19

## 2021-11-08 RX ADMIN — Medication 240 MILLIGRAM(S): at 05:04

## 2021-11-08 RX ADMIN — Medication 2: at 12:19

## 2021-11-08 RX ADMIN — Medication 25 MILLIGRAM(S): at 05:04

## 2021-11-08 RX ADMIN — LOSARTAN POTASSIUM 25 MILLIGRAM(S): 100 TABLET, FILM COATED ORAL at 05:04

## 2021-11-08 NOTE — DISCHARGE NOTE PROVIDER - NSDCMRMEDTOKEN_GEN_ALL_CORE_FT
aspirin 81 mg oral delayed release tablet: 1 tab(s) orally once a day  Cardizem  mg/24 hours oral capsule, extended release: 1 cap(s) orally once a day  Eliquis 5 mg oral tablet: 1 tab(s) orally every 12 hours  Lopressor 50 mg oral tablet: 1 tab(s) orally 2 times a day  losartan 25 mg oral tablet: 1 tab(s) orally once a day  metFORMIN 500 mg oral tablet, extended release: 1 tab(s) orally once a day   alcohol swabs : Apply topically to affected area 4 times a day   aspirin 81 mg oral delayed release tablet: 1 tab(s) orally once a day  Cardizem  mg/24 hours oral capsule, extended release: 1 cap(s) orally once a day  Eliquis 5 mg oral tablet: 1 tab(s) orally every 12 hours  glucometer (per patient&#x27;s insurance): Test blood sugars four times a day. Dispense #1 glucometer.  lancets: 1 application subcutaneously 4 times a day   Lopressor 50 mg oral tablet: 1 tab(s) orally 2 times a day  losartan 25 mg oral tablet: 1 tab(s) orally once a day  metFORMIN 500 mg oral tablet, extended release: 1 tab(s) orally once a day  test strips (per patient&#x27;s insurance): 1 application subcutaneously 4 times a day. ** Compatible with patient&#x27;s glucometer **

## 2021-11-08 NOTE — DISCHARGE NOTE PROVIDER - DISCHARGE DIET
Review of Systems:  CONSTITUTIONAL: No fever, No diaphoresis   SKIN: No rash, +laceration   HEMATOLOGIC: No abnormal bleeding   EYES: No eye pain, No blurred vision  ENT: No sore throat, No neck pain, No rhinorrhea  RESPIRATORY: No shortness of breath, No cough  CARDIAC: No chest pain, No palpitations  GI: No abdominal pain, No nausea, No vomiting  : No dysuria, frequency, hematuria.   MUSCULOSKELETAL: No joint paint, No swelling, No back pain  NEUROLOGIC: No numbness, No focal weakness, No headache, No dizziness  All other systems negative, unless specified in HPI
DASH Diet/Consistent Carbohydrate Diabetic Diets

## 2021-11-08 NOTE — DISCHARGE NOTE PROVIDER - PROVIDER TOKENS
PROVIDER:[TOKEN:[[id number]]] PROVIDER:[TOKEN:[[id number]]],PROVIDER:[TOKEN:[[id number]]]

## 2021-11-08 NOTE — DISCHARGE NOTE PROVIDER - HOSPITAL COURSE
HPI:  This is an 87 y/o M with PMH of HTN, DM type 2, Nephrolithiasis s/p Lithotripsy, Renal Mass s/p cryo ablation, and BPH s/p TURP who presented with 2 days history of intermittent episodes of fast irregular heart beats, tonight he found his heart rate in the two hundred's range on his home monitor, so he got concerned and called his daughter who brought him to the hospital. The episodes are of sudden onset & offset, worst one was while he was resting watching TV, not associated with chest pain, SOB, diaphoresis, dizziness, nausea, or vomiting. At the ED he was found to be in A. Fib with RVR, received Diltiazem 10 mg IVP X2 doses by ED team without a response, so they started him on Diltiazem infusion. Patient denies any similar episodes in the past.   (05 Nov 2021 23:42)      Vital Signs Last 24 Hrs  T(C): 36.6 (08 Nov 2021 08:05), Max: 37 (08 Nov 2021 04:57)  T(F): 97.8 (08 Nov 2021 08:05), Max: 98.6 (08 Nov 2021 04:57)  HR: 90 (08 Nov 2021 08:00) (76 - 101)  BP: 110/69 (08 Nov 2021 08:00) (94/64 - 123/105)  BP(mean): 80 (08 Nov 2021 08:00) (73 - 112)  RR: 20 (08 Nov 2021 08:00) (11 - 23)  SpO2: 97% (08 Nov 2021 08:00) (93% - 97%)    PHYSICAL EXAM:  GENERAL: NAD  HEENT:  anicteric, moist mucous membranes  CHEST/LUNG:  CTA b/l, no rales, wheezes, or rhonchi  HEART:  irregular at 88bpm, S1, S2  ABDOMEN:  BS+, soft, nontender, nondistended  EXTREMITIES: no cyanosis or calf tenderness  NERVOUS SYSTEM: answers questions and follows commands appropriately    87yo M with PMH of HTN, DM type 2, Nephrolithiasis s/p Lithotripsy, Renal Mass s/p cryo ablation, and BPH s/p TURP presented with palpitations a/w A. Fib with RVR.     Problem/Plan - 1:  ·  Problem: atrial fibrillation with rvr   - c/w heparin drip; pt considering A/C options for outpt  - cardiology consult with Dr. Frausto, recs appreciated  - started on cardizem 240mg po daily by cardio yesterday and cardizem drip discontinued yesterday  - losartan was decreased from 50mg to 25mg daily to give more BP room for AVN blockers, HCTZ to be dc'ed upon discharge home.   spoke with Cardio, transitioned to metoprolol 50mg twice a day and continue cardizem 240mg daily     Problem/Plan - 2:  ·  Problem: Hypokalemia.   - likely due to thiazide diuretic use  - potassium repleted and given additional 40meq po x1 today as goal K > 4  - given addition of AVN blockers and low normal BP would favor discharging pt off his HCTZ on discharge       Problem/Plan - 3:  ·  Problem: DM2 (diabetes mellitus, type 2).   - c/w insulin Glargine 10 units Q am, in addition to corrective insulin Lispro scale coverage before meals & at bedtime,  - Continue  Losartan but at lower dose  - Hgb A1c of 7.3%  - monitor FSG     Problem/Plan - 4:  ·  Problem: HTN (hypertension).   - started on cardizem 240mg po daily by cardio yesterday and cardizem drip discontinued yesterday  - c/w lopressor  - decreases losartan from 50mg to 25mg to give more BP room for AVN blockers (also, would favor discharging pt off his HCTZ on discharge)       Problem/Plan - 5:  ·  Problem: VTE ppx.   to be discharged on eliquis        HPI:  This is an 87 y/o M with PMH of HTN, DM type 2, Nephrolithiasis s/p Lithotripsy, Renal Mass s/p cryo ablation, and BPH s/p TURP who presented with 2 days history of intermittent episodes of fast irregular heart beats, tonight he found his heart rate in the two hundred's range on his home monitor, so he got concerned and called his daughter who brought him to the hospital. The episodes are of sudden onset & offset, worst one was while he was resting watching TV, not associated with chest pain, SOB, diaphoresis, dizziness, nausea, or vomiting. At the ED he was found to be in A. Fib with RVR, received Diltiazem 10 mg IVP X2 doses by ED team without a response, so they started him on Diltiazem infusion. Patient denies any similar episodes in the past.   (05 Nov 2021 23:42)      Vital Signs Last 24 Hrs  T(C): 36.6 (08 Nov 2021 08:05), Max: 37 (08 Nov 2021 04:57)  T(F): 97.8 (08 Nov 2021 08:05), Max: 98.6 (08 Nov 2021 04:57)  HR: 90 (08 Nov 2021 08:00) (76 - 101)  BP: 110/69 (08 Nov 2021 08:00) (94/64 - 123/105)  BP(mean): 80 (08 Nov 2021 08:00) (73 - 112)  RR: 20 (08 Nov 2021 08:00) (11 - 23)  SpO2: 97% (08 Nov 2021 08:00) (93% - 97%)    PHYSICAL EXAM:  GENERAL: NAD  HEENT:  anicteric, moist mucous membranes  CHEST/LUNG:  CTA b/l, no rales, wheezes, or rhonchi  HEART:  irregular at 88bpm, S1, S2  ABDOMEN:  BS+, soft, nontender, nondistended  EXTREMITIES: no cyanosis or calf tenderness  NERVOUS SYSTEM: answers questions and follows commands appropriately    87yo M with PMH of HTN, DM type 2, Nephrolithiasis s/p Lithotripsy, Renal Mass s/p cryo ablation, and BPH s/p TURP presented with palpitations a/w A. Fib with RVR.     Problem/Plan - 1:  ·  Problem: atrial fibrillation with rvr   - c/w heparin drip; pt considering A/C options for outpt  - cardiology consult with Dr. Frausto, recs appreciated  - started on cardizem 240mg po daily by cardio yesterday and cardizem drip discontinued yesterday  - losartan was decreased from 50mg to 25mg daily to give more BP room for AVN blockers, HCTZ to be dc'ed upon discharge home.   spoke with Cardio, transitioned to metoprolol 50mg twice a day and continue cardizem 240mg daily     Problem/Plan - 2:  ·  Problem: Hypokalemia.   - likely due to thiazide diuretic use  - potassium repleted and given additional 40meq po x1 today as goal K > 4  - given addition of AVN blockers and low normal BP would favor discharging pt off his HCTZ on discharge       Problem/Plan - 3:  ·  Problem: DM2 (diabetes mellitus, type 2).   - c/w insulin Glargine 10 units Q am, in addition to corrective insulin Lispro scale coverage before meals & at bedtime,  - Continue  Losartan but at lower dose  - Hgb A1c of 7.3%  - monitor FSG  - To be discharged on home metformin.  Patient requested glucometer.  Supplies sent.  Advised to f/u with PMD to assess A1C in 2 to 3 months.  If worsening, may need to start on insulin.      Problem/Plan - 4:  ·  Problem: HTN (hypertension).   - started on cardizem 240mg po daily by cardio yesterday and cardizem drip discontinued yesterday  - c/w lopressor  - decreases losartan from 50mg to 25mg to give more BP room for AVN blockers (also, would favor discharging pt off his HCTZ on discharge)       Problem/Plan - 5:  ·  Problem: VTE ppx.   to be discharged on eliquis

## 2021-11-08 NOTE — DISCHARGE NOTE NURSING/CASE MANAGEMENT/SOCIAL WORK - NSSCNAMETXT_GEN_ALL_CORE
[facility] - [phone]  Nurse to visit the day after hospital discharge. Please contact the home care agency at the above phone number if you have not heard from them by 12 noon on the day after your hospital discharge.

## 2021-11-08 NOTE — PROGRESS NOTE ADULT - SUBJECTIVE AND OBJECTIVE BOX
Chief Complaint: Tachycardia    Interval Events: No events overnight.    Review of Systems:  General: No fevers, chills, weight gain  Skin: No rashes, color changes  Cardiovascular: No chest pain, orthopnea  Respiratory: No shortness of breath, cough  Gastrointestinal: No nausea, abdominal pain  Genitourinary: No incontinence, pain with urination  Musculoskeletal: No pain, swelling, decreased range of motion  Neurological: No headache, weakness  Psychiatric: No depression, anxiety  Endocrine: No weight gain, increased thirst  All other systems are comprehensively negative.    Physical Exam:  Vitals:        Vital Signs Last 24 Hrs  T(C): 37 (08 Nov 2021 04:57), Max: 37 (08 Nov 2021 04:57)  T(F): 98.6 (08 Nov 2021 04:57), Max: 98.6 (08 Nov 2021 04:57)  HR: 90 (08 Nov 2021 08:00) (76 - 115)  BP: 110/69 (08 Nov 2021 08:00) (94/64 - 123/105)  BP(mean): 80 (08 Nov 2021 08:00) (73 - 112)  RR: 20 (08 Nov 2021 08:00) (11 - 23)  SpO2: 97% (08 Nov 2021 08:00) (93% - 98%)  General: NAD  HEENT: MMM  Neck: No JVD, no carotid bruit  Lungs: CTAB  CV: Irregular, nl S1/S2, no M/R/G  Abdomen: S/NT/ND, +BS  Extremities: No LE edema, no cyanosis  Neuro: AAOx3, non-focal  Skin: No rash    Labs:                        13.3   6.56  )-----------( 130      ( 08 Nov 2021 06:26 )             41.1     11-08    141  |  107  |  14  ----------------------------<  148<H>  3.7   |  26  |  0.80    Ca    9.6      08 Nov 2021 06:26  Phos  3.0     11-08  Mg     2.1     11-08    TPro  6.3  /  Alb  3.0<L>  /  TBili  0.7  /  DBili  x   /  AST  15  /  ALT  27  /  AlkPhos  67  11-07        PTT - ( 08 Nov 2021 06:26 )  PTT:77.0 sec    Telemetry: Atrial fibrillation

## 2021-11-08 NOTE — DISCHARGE NOTE PROVIDER - NSDCCPCAREPLAN_GEN_ALL_CORE_FT
PRINCIPAL DISCHARGE DIAGNOSIS  Diagnosis: New onset atrial fibrillation  Assessment and Plan of Treatment: new medication Metoprolol 50mg twice a day, Cardizem 240mg daily, eliquis 5mg twice a day.  We decreased losartan 50mg to 25mg daily.  We stopped hydrochlorothiazide.   Please follow up with your cardiologist for routine care.

## 2021-11-08 NOTE — DISCHARGE NOTE PROVIDER - CARE PROVIDER_API CALL
Shaw Frausto)  Cardiovascular Disease; Internal Medicine  [address]  [place] [address]  Phone: [phone]  Fax: [phone]  Follow Up Time:    Shaw Frausto)  Cardiovascular Disease; Internal Medicine  [address] [facility], [address]  [place] [address]  Phone: [phone]  Fax: [phone]  Follow Up Time:     [address]  INTERNAL MEDICINE  [address], [address]  [place] [address]  Phone: [phone]  Fax: [phone]  Follow Up Time:

## 2021-11-08 NOTE — PROGRESS NOTE ADULT - ASSESSMENT
The patient is an 88 year old male with a history of HTN, DM, BPH, TIA who is admitted with rapid atrial fibrillation.    Plan:  - Echo with normal LV systolic function, no significant valve issues  - Continue diltiazem  mg daily  - Increase metoprolol tartrate to 50 mg bid  - Transition from heparin drip to apixaban 5 mg bid  - Discharge planning
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    Admitted for irregular increased heart rate noted on his home monitor  He denies any other cardiac symptoms  IV Cardizem started  Troponin negative x2  Potassium-3.7     Mag-2.2  [id number]    11/7/21  Seen at [facility] ICU  Patient sitting in chair, no complaints  Heart rate better controlled  Monitor-A-Fib 100-110/minute      Impression:  A-Fib length unknown  Acute MI ruled-out    Plan:  - Try to titrate IV Cardizem to off  - Start PO Cardizem  - Lower Losartan  - Continue Metoprolol with parameters and titrate as needed  - Echocardiogram report pending  - Heparin with conversion to oral anticoagulant  - Normal TSH  - Need copy of old EKG  - Consider Sleep study as out-patient
87 y/o M with PMH of HTN, DM type 2, Nephrolithiasis s/p Lithotripsy, Renal Mass s/p cryo ablation, and BPH s/p TURP presented with Palpitation episodes, found to have A. Fib with RVR.     Problem/Plan - 1:  ·  Problem: atrial fibrillation with rvr   ·  Plan: c.w dilt and heparin drip  -, cardiology consult with Dr. Kern was called.  tte ordered     Problem/Plan - 2:  ·  Problem: Hypokalemia.   ·  Plan: ML 2ry to thiazide diuretic use, hold, supplemented with potassium chloride 40 meq PO X2 doses,   tredn daily     Problem/Plan - 3:  ·  Problem: DM2 (diabetes mellitus, type 2).   ·  Plan   insulin Glargine 10 units Q am, in addition to corrective insulin Lispro scale coverage before meals & at bedtime,  . Continue  Losartan.     Problem/Plan - 4:  ·  Problem: HTN (hypertension).   ·  Plan: controlled, hold HCTZ, and continue on Losartan 100 mg PO daily with hold parameters.     Problem/Plan - 5:  ·  Problem: Need for prophylactic measure.   ·  Plan: heparin drip 
88 year old male admitted for irregular increased heart rate noted on his home monitor, denied any other cardiac symptoms, IV Cardizem started, troponin negative x2  Continue IV Cardizem, Metoprolol with parameters, Echocardiogram Heparin then conversion to oral anticoagulant, following TSH, following electrolytes  and chronic treatment of DM ,HTN and prophylactic measures and stable tonight no new issues  or events     Time spent: assessing presenting problems of acute illness that poses high probability  end organ damage/dysfunction.  Medical decision making inculding plan of daily care, reviewing data, reviewing radiology, discussing with multidisciplinary team, non inclusive of procedures, discussing goals of care with patient/family       
88y Male PMHx HTN, DM type 2, Nephrolithiasis (s/p Lithotripsy), Renal Mass (s/p cryo ablation) BPH (s/p TURP).  Admitted 11/5/21 with 2 days history of intermittent episodes of fast irregular heart beats.     AF with rapid Ventricular Response       Off Cardizem Gtt   PO Cardizem and Metoprolol   Cont Heparin Gtt - conversion to Oral AC   [id number]  No Myoischemia   Cardio Following     
87yo M with PMH of HTN, DM type 2, Nephrolithiasis s/p Lithotripsy, Renal Mass s/p cryo ablation, and BPH s/p TURP presented with palpitations a/w A. Fib with RVR.     Problem/Plan - 1:  ·  Problem: atrial fibrillation with rvr   - c/w heparin drip; pt considering A/C options for outpt  - cardiology consult with Dr. Frausto, recs appreciated  - started on cardizem 240mg po daily by cardio today and cardizem drip discontinued in the afternoon  - c/w lopressor  - will decrease losartan from 50mg to 25mg starting tomorrow to give more BP room for AVN blockers (also, would favor discharging pt off his HCTZ on discharge)     Problem/Plan - 2:  ·  Problem: Hypokalemia.   - likely due to thiazide diuretic use  - potassium repleted and given additional 40meq po x1 today as goal K > 4  - given addition of AVN blockers and low normal BP would favor discharging pt off his HCTZ on discharge       Problem/Plan - 3:  ·  Problem: DM2 (diabetes mellitus, type 2).   - c/w insulin Glargine 10 units Q am, in addition to corrective insulin Lispro scale coverage before meals & at bedtime,  - Continue  Losartan but at lower dose  - Hgb A1c of 7.3%  - monitor FSG     Problem/Plan - 4:  ·  Problem: HTN (hypertension).   - started on cardizem 240mg po daily by cardio today and cardizem drip discontinued in the afternoon  - c/w lopressor  - will decrease losartan from 50mg to 25mg starting tomorrow to give more BP room for AVN blockers (also, would favor discharging pt off his HCTZ on discharge)       Problem/Plan - 5:  ·  Problem: VTE ppx.   - c/w heparin drip

## 2021-11-08 NOTE — PROGRESS NOTE ADULT - SUBJECTIVE AND OBJECTIVE BOX
Critical Care PA - LOVE     88y Male PMHx HTN, DM type 2, Nephrolithiasis (s/p Lithotripsy), Renal Mass (s/p cryo ablation) BPH (s/p TURP).  Admitted 11/5/21 with 2 days history of intermittent episodes of fast irregular heart beats.  In  ED he was found to be in A. Fib with RVR, received Diltiazem 10 mg IVP X2 doses by ED team without a response, so they started him on Diltiazem infusion.  Oral tali agent were started and patient was liberated from Dilt Gtt.        --- PMHx.---    Hypertension    BPH (benign prostatic hyperplasia)    DM (diabetes mellitus)    2019 novel coronavirus disease (COVID-19)           Review Of Systems: All reviewed systems were negative.    Previous Medical Record reviewed and case has been discussed with EICU.    --- Medications ---    acetaminophen     Tablet .. 650 milliGRAM(s) PRN  aspirin enteric coated 81 milliGRAM(s)  dextrose 40% Gel 15 Gram(s)  dextrose 5%. 1000 milliLiter(s)  dextrose 5%. 1000 milliLiter(s)  dextrose 50% Injectable 25 Gram(s)  dextrose 50% Injectable 12.5 Gram(s)  dextrose 50% Injectable 25 Gram(s)  diltiazem    milliGRAM(s)  glucagon  Injectable 1 milliGRAM(s)  heparin   Injectable 7500 Unit(s) PRN  heparin   Injectable 3500 Unit(s) PRN  heparin  Infusion.  Unit(s)/Hr  influenza  Vaccine (HIGH DOSE) 0.7 milliLiter(s)  insulin glargine Injectable (LANTUS) 10 Unit(s)  insulin lispro (ADMELOG) corrective regimen sliding scale    insulin lispro (ADMELOG) corrective regimen sliding scale    losartan 25 milliGRAM(s)  metoprolol tartrate 25 milliGRAM(s)  polyethylene glycol 3350 17 Gram(s) PRN      DVT Prophylaxis : Full AC w/ Heparin     Advanced Directives : Full Code     T(C): 37 (11-08-21 @ 04:57), Max: 37 (11-08-21 @ 04:57)  HR: 97 (11-08-21 @ 04:00)  BP: 120/69 (11-08-21 @ 04:00)  RR: 23 (11-08-21 @ 04:00)  SpO2: 93% (11-08-21 @ 04:00)    --- Labratories ---                           14.1   6.48  )-----------( 130      ( 07 Nov 2021 06:42 )             42.7    11-07    139  |  105  |  14  ----------------------------<  143<H>  3.6   |  26  |  0.79    Ca    9.6      07 Nov 2021 06:42  Phos  2.7     11-06  Mg     2.0     11-07    TPro  6.3  /  Alb  3.0<L>  /  TBili  0.7  /  DBili  x   /  AST  15  /  ALT  27  /  AlkPhos  67  11-07  PTT - ( 07 Nov 2021 04:03 )  PTT:81.2 sec        Radiology  / Ultrasound : ****    (Reviewing data, imaging, discussing with multidisciplinary team, non inclusive of procedures, discussing goals of care with patient/family)

## 2022-02-23 PROBLEM — U07.1 COVID-19: Chronic | Status: ACTIVE | Noted: 2021-11-06

## 2022-02-23 PROBLEM — E11.9 TYPE 2 DIABETES MELLITUS WITHOUT COMPLICATIONS: Chronic | Status: ACTIVE | Noted: 2021-11-06

## 2022-02-24 ENCOUNTER — APPOINTMENT (OUTPATIENT)
Dept: PULMONOLOGY | Facility: CLINIC | Age: 87
End: 2022-02-24
Payer: MEDICARE

## 2022-02-24 VITALS
OXYGEN SATURATION: 97 % | DIASTOLIC BLOOD PRESSURE: 110 MMHG | SYSTOLIC BLOOD PRESSURE: 156 MMHG | WEIGHT: 218 LBS | HEIGHT: 71 IN | BODY MASS INDEX: 30.52 KG/M2

## 2022-02-24 DIAGNOSIS — Z90.89 ACQUIRED ABSENCE OF OTHER ORGANS: ICD-10-CM

## 2022-02-24 DIAGNOSIS — Z78.9 OTHER SPECIFIED HEALTH STATUS: ICD-10-CM

## 2022-02-24 DIAGNOSIS — Z90.5 ACQUIRED ABSENCE OF KIDNEY: ICD-10-CM

## 2022-02-24 PROCEDURE — 71046 X-RAY EXAM CHEST 2 VIEWS: CPT

## 2022-02-24 PROCEDURE — 99204 OFFICE O/P NEW MOD 45 MIN: CPT | Mod: 25

## 2022-02-24 PROCEDURE — 94618 PULMONARY STRESS TESTING: CPT

## 2022-02-24 PROCEDURE — 99203 OFFICE O/P NEW LOW 30 MIN: CPT | Mod: 25

## 2022-02-24 RX ORDER — APIXABAN 5 MG/1
5 TABLET, FILM COATED ORAL
Refills: 0 | Status: ACTIVE | COMMUNITY

## 2022-02-24 RX ORDER — METOPROLOL TARTRATE 50 MG/1
TABLET ORAL
Refills: 0 | Status: ACTIVE | COMMUNITY

## 2022-02-24 RX ORDER — METFORMIN HYDROCHLORIDE 625 MG/1
TABLET ORAL
Refills: 0 | Status: ACTIVE | COMMUNITY

## 2022-02-24 RX ORDER — DILTIAZEM HYDROCHLORIDE 60 MG/1
TABLET, COATED ORAL
Refills: 0 | Status: ACTIVE | COMMUNITY

## 2022-02-24 NOTE — ASSESSMENT
[FreeTextEntry1] : obtain ct chest to further evaluate\par consider increasing diuretics\par fu with cardiology

## 2022-02-24 NOTE — HISTORY OF PRESENT ILLNESS
[Never] : never [TextBox_4] : 89M HTN, DM, Afib on eliquis\par \par COVID infection summer 2021, was in [place], not hospitalized but very sick.  Family took care of him at home, took an antiviral med from [facility], ivermectin, zpack x 2, supplemental O2.  \par \par Since then was diagnosed with afib\par now has URIOSTEGUI\par also complains of throat congestion\par no chest pain or coughing other than when he tries to clear his throat\par \par no toxic exposures

## 2022-02-24 NOTE — PROCEDURE
[FreeTextEntry1] : exercise oximetry: no significant O2 desat with 6 min of walking on room air\par \par CXR: small bilateral effusions, cannot r/o density RLL, cardiac silhouette appears normal.  No bony abnormality.\par

## 2022-04-17 ENCOUNTER — TRANSCRIPTION ENCOUNTER (OUTPATIENT)
Age: 87
End: 2022-04-17

## 2022-05-18 ENCOUNTER — APPOINTMENT (OUTPATIENT)
Dept: PULMONOLOGY | Facility: CLINIC | Age: 87
End: 2022-05-18
Payer: MEDICARE

## 2022-05-18 DIAGNOSIS — I10 ESSENTIAL (PRIMARY) HYPERTENSION: ICD-10-CM

## 2022-05-18 DIAGNOSIS — R93.89 ABNORMAL FINDINGS ON DIAGNOSTIC IMAGING OF OTHER SPECIFIED BODY STRUCTURES: ICD-10-CM

## 2022-05-18 PROCEDURE — 71046 X-RAY EXAM CHEST 2 VIEWS: CPT

## 2022-05-18 PROCEDURE — 99214 OFFICE O/P EST MOD 30 MIN: CPT | Mod: 25

## 2022-05-18 NOTE — PROCEDURE
[FreeTextEntry1] : CT chest demonstrates nodular interlobular septal thickening and discrete medial right lower lobe nodule superimposed on atelectatic opacities and bilateral pleural effusions.

## 2022-05-18 NOTE — HISTORY OF PRESENT ILLNESS
LMVM for RN ; agree with stopping Lasix [TextBox_4] : Patient here for follow-up of recent abnormal chest CT seen earlier this year for cough chest x-ray normal.  Sent for CT scan noted to be abnormal in the interim cough has resolved.  Not complaining of shortness of breath.\par \par Recently diagnosed with atrial fibrillation

## 2022-05-18 NOTE — ASSESSMENT
[FreeTextEntry1] : Findings on chest CT nonspecific.  Concern raised about neoplasm but I think it is more likely these represent fluid overload we will check labs.

## 2022-05-19 LAB
ALBUMIN SERPL ELPH-MCNC: 4.1 G/DL
ALP BLD-CCNC: 82 U/L
ALT SERPL-CCNC: 23 U/L
ANION GAP SERPL CALC-SCNC: 12 MMOL/L
AST SERPL-CCNC: 18 U/L
BASOPHILS # BLD AUTO: 0.03 K/UL
BASOPHILS NFR BLD AUTO: 0.5 %
BILIRUB SERPL-MCNC: 0.5 MG/DL
BUN SERPL-MCNC: 13 MG/DL
CALCIUM SERPL-MCNC: 10.3 MG/DL
CHLORIDE SERPL-SCNC: 100 MMOL/L
CO2 SERPL-SCNC: 26 MMOL/L
CREAT SERPL-MCNC: 0.92 MG/DL
EGFR: 80 ML/MIN/1.73M2
EOSINOPHIL # BLD AUTO: 0.05 K/UL
EOSINOPHIL NFR BLD AUTO: 0.8 %
GLUCOSE SERPL-MCNC: 239 MG/DL
HCT VFR BLD CALC: 44.5 %
HGB BLD-MCNC: 14.6 G/DL
IMM GRANULOCYTES NFR BLD AUTO: 0.3 %
LYMPHOCYTES # BLD AUTO: 1.72 K/UL
LYMPHOCYTES NFR BLD AUTO: 28 %
MAN DIFF?: NORMAL
MCHC RBC-ENTMCNC: 29.9 PG
MCHC RBC-ENTMCNC: 32.8 GM/DL
MCV RBC AUTO: 91.2 FL
MONOCYTES # BLD AUTO: 0.57 K/UL
MONOCYTES NFR BLD AUTO: 9.3 %
NEUTROPHILS # BLD AUTO: 3.75 K/UL
NEUTROPHILS NFR BLD AUTO: 61.1 %
NT-PROBNP SERPL-MCNC: 1153 PG/ML
PLATELET # BLD AUTO: 154 K/UL
POTASSIUM SERPL-SCNC: 3.9 MMOL/L
PROT SERPL-MCNC: 6.8 G/DL
RBC # BLD: 4.88 M/UL
RBC # FLD: 13.6 %
SODIUM SERPL-SCNC: 137 MMOL/L
WBC # FLD AUTO: 6.14 K/UL

## 2022-07-15 ENCOUNTER — APPOINTMENT (OUTPATIENT)
Dept: PULMONOLOGY | Facility: CLINIC | Age: 87
End: 2022-07-15

## 2022-07-15 VITALS
OXYGEN SATURATION: 99 % | HEIGHT: 71 IN | DIASTOLIC BLOOD PRESSURE: 91 MMHG | BODY MASS INDEX: 30.38 KG/M2 | WEIGHT: 217 LBS | HEART RATE: 88 BPM | SYSTOLIC BLOOD PRESSURE: 145 MMHG | TEMPERATURE: 98.6 F

## 2022-07-15 DIAGNOSIS — I50.9 HEART FAILURE, UNSPECIFIED: ICD-10-CM

## 2022-07-15 DIAGNOSIS — R06.00 DYSPNEA, UNSPECIFIED: ICD-10-CM

## 2022-07-15 DIAGNOSIS — J44.9 CHRONIC OBSTRUCTIVE PULMONARY DISEASE, UNSPECIFIED: ICD-10-CM

## 2022-07-15 PROCEDURE — 94618 PULMONARY STRESS TESTING: CPT

## 2022-07-15 PROCEDURE — 71046 X-RAY EXAM CHEST 2 VIEWS: CPT

## 2022-07-15 PROCEDURE — 94010 BREATHING CAPACITY TEST: CPT

## 2022-07-15 PROCEDURE — 99214 OFFICE O/P EST MOD 30 MIN: CPT | Mod: 25

## 2022-07-15 RX ORDER — INHALER, ASSIST DEVICES
SPACER (EA) MISCELLANEOUS
Qty: 1 | Refills: 5 | Status: ACTIVE | COMMUNITY
Start: 2022-07-15 | End: 1900-01-01

## 2022-07-16 PROBLEM — I50.9 CHF (CONGESTIVE HEART FAILURE): Status: ACTIVE | Noted: 2022-07-16

## 2022-07-16 PROBLEM — J44.9 COPD (CHRONIC OBSTRUCTIVE PULMONARY DISEASE): Status: ACTIVE | Noted: 2022-07-16

## 2022-07-16 RX ORDER — FLUTICASONE PROPIONATE AND SALMETEROL XINAFOATE 115; 21 UG/1; UG/1
115-21 AEROSOL, METERED RESPIRATORY (INHALATION)
Qty: 1 | Refills: 5 | Status: ACTIVE | COMMUNITY
Start: 2022-07-16

## 2022-07-16 NOTE — ASSESSMENT
[FreeTextEntry1] : Dyspnea appears to be combination of COPD and CHF.  It is reasonable to increase medications for both but I would not do them at the same time just to get a better assessment of which is a problem.  For now start Advair.  Apparently patient's daughter has a large supply of Advair available to him

## 2022-07-16 NOTE — HISTORY OF PRESENT ILLNESS
[Never] : never [TextBox_4] : Follow-up for increasing shortness of breath.  Notes shortness of breath on exertion and difficulty lying flat.  Did have recent URI with increased mucus production.  Saw PCP who is recommending increasing diuretic dose.  An echocardiogram is pending. \par

## 2023-07-07 NOTE — ED PROVIDER NOTE - NSTIMEPROVIDERCAREINITIATE_GEN_ER
Care Management Follow Up    Length of Stay (days): 4    Expected Discharge Date: 07/08/2023 if cultures stay negative      Concerns to be Addressed:     IV antibiotics  Patient plan of care discussed at interdisciplinary rounds: Yes    Anticipated Discharge Disposition:  Home with family and home care   Anticipated Discharge Services:  Home infusion  Anticipated Discharge DME:      Patient/family educated on Medicare website which has current facility and service quality ratings:  Not applicable   Education Provided on the Discharge Plan:  yes  Patient/Family in Agreement with the Plan:  yes    Referrals Placed by CM/SW:    Private pay costs discussed: Not applicable    Additional Information:  CM following for possible home infusion needs for antibiotics.       ZHEN Kwon       07-May-2017 14:25

## 2023-07-17 NOTE — DISCHARGE NOTE NURSING/CASE MANAGEMENT/SOCIAL WORK - PATIENT PORTAL LINK FT
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 Admitted for  HARLEEN on CKD 2/2 obstructive uropathy and acute blood loss anemia 2/2 hematuria. Nephrology has been consulted and renal US ordered for further evaluation. Patient will likely need HD. Urology consulted and waiting on sono and CT scan with IV contrast after HD for further evaluation. The patient has met with Dr. Spivey at [facility] but has not initiated any systemic therapy to date. No plan for inpatient chemotherapy but will arrange for follow up with Dr. Spivey following discharge. Oncology will follow with you during this hospitalization.
You can access the FollowMyHealth Patient Portal offered by [facility] by registering at the following website: [email or web address]. By joining [email or web address]’s FollowMyHealth portal, you will also be able to view your health information using other applications (apps) compatible with our system.

## 2025-07-13 NOTE — ED PROVIDER NOTE - CPE EDP CARDIAC NORM
Refill Decision Note   [facility]  is requesting a refill authorization.  Brief Assessment and Rationale for Refill:  Approve     Medication Therapy Plan:         Comments:     Note composed:10:02 PM 07/12/2025             normal...